# Patient Record
Sex: FEMALE | Race: WHITE | NOT HISPANIC OR LATINO | URBAN - METROPOLITAN AREA
[De-identification: names, ages, dates, MRNs, and addresses within clinical notes are randomized per-mention and may not be internally consistent; named-entity substitution may affect disease eponyms.]

---

## 2017-01-01 ENCOUNTER — INPATIENT (INPATIENT)
Facility: HOSPITAL | Age: 82
LOS: 13 days | End: 2017-02-09
Attending: INTERNAL MEDICINE | Admitting: INTERNAL MEDICINE

## 2017-01-01 ENCOUNTER — INPATIENT (INPATIENT)
Facility: HOSPITAL | Age: 82
LOS: 1 days | Discharge: HOSPICE | End: 2017-01-26
Attending: HOSPITALIST | Admitting: HOSPITALIST
Payer: MEDICARE

## 2017-01-01 VITALS
SYSTOLIC BLOOD PRESSURE: 135 MMHG | DIASTOLIC BLOOD PRESSURE: 61 MMHG | HEART RATE: 47 BPM | RESPIRATION RATE: 18 BRPM | OXYGEN SATURATION: 99 % | TEMPERATURE: 97 F

## 2017-01-01 VITALS
SYSTOLIC BLOOD PRESSURE: 134 MMHG | WEIGHT: 117.29 LBS | TEMPERATURE: 96 F | HEIGHT: 62 IN | OXYGEN SATURATION: 93 % | DIASTOLIC BLOOD PRESSURE: 82 MMHG | RESPIRATION RATE: 18 BRPM | HEART RATE: 63 BPM

## 2017-01-01 VITALS
TEMPERATURE: 99 F | DIASTOLIC BLOOD PRESSURE: 89 MMHG | OXYGEN SATURATION: 98 % | RESPIRATION RATE: 20 BRPM | HEART RATE: 136 BPM | SYSTOLIC BLOOD PRESSURE: 144 MMHG

## 2017-01-01 VITALS — TEMPERATURE: 100 F

## 2017-01-01 DIAGNOSIS — R55 SYNCOPE AND COLLAPSE: ICD-10-CM

## 2017-01-01 DIAGNOSIS — R00.1 BRADYCARDIA, UNSPECIFIED: ICD-10-CM

## 2017-01-01 DIAGNOSIS — N39.0 URINARY TRACT INFECTION, SITE NOT SPECIFIED: ICD-10-CM

## 2017-01-01 DIAGNOSIS — G30.9 ALZHEIMER'S DISEASE, UNSPECIFIED: ICD-10-CM

## 2017-01-01 DIAGNOSIS — I44.2 ATRIOVENTRICULAR BLOCK, COMPLETE: ICD-10-CM

## 2017-01-01 DIAGNOSIS — E87.6 HYPOKALEMIA: ICD-10-CM

## 2017-01-01 DIAGNOSIS — F02.80 DEMENTIA IN OTHER DISEASES CLASSIFIED ELSEWHERE, UNSPECIFIED SEVERITY, WITHOUT BEHAVIORAL DISTURBANCE, PSYCHOTIC DISTURBANCE, MOOD DISTURBANCE, AND ANXIETY: ICD-10-CM

## 2017-01-01 DIAGNOSIS — I10 ESSENTIAL (PRIMARY) HYPERTENSION: ICD-10-CM

## 2017-01-01 DIAGNOSIS — R45.1 RESTLESSNESS AND AGITATION: ICD-10-CM

## 2017-01-01 DIAGNOSIS — G30.8 OTHER ALZHEIMER'S DISEASE: ICD-10-CM

## 2017-01-01 DIAGNOSIS — I49.8 OTHER SPECIFIED CARDIAC ARRHYTHMIAS: ICD-10-CM

## 2017-01-01 DIAGNOSIS — I49.9 CARDIAC ARRHYTHMIA, UNSPECIFIED: ICD-10-CM

## 2017-01-01 DIAGNOSIS — R50.9 FEVER, UNSPECIFIED: ICD-10-CM

## 2017-01-01 DIAGNOSIS — I47.2 VENTRICULAR TACHYCARDIA: ICD-10-CM

## 2017-01-01 LAB
-  AMPICILLIN: SIGNIFICANT CHANGE UP
-  CIPROFLOXACIN: SIGNIFICANT CHANGE UP
-  NITROFURANTOIN: SIGNIFICANT CHANGE UP
-  TETRACYCLINE: SIGNIFICANT CHANGE UP
-  VANCOMYCIN: SIGNIFICANT CHANGE UP
ALBUMIN SERPL ELPH-MCNC: 3.4 G/DL — SIGNIFICANT CHANGE UP (ref 3.3–5)
ALP SERPL-CCNC: 79 U/L — SIGNIFICANT CHANGE UP (ref 40–120)
ALT FLD-CCNC: 20 U/L — SIGNIFICANT CHANGE UP (ref 12–78)
ANION GAP SERPL CALC-SCNC: 10 MMOL/L — SIGNIFICANT CHANGE UP (ref 5–17)
ANION GAP SERPL CALC-SCNC: 11 MMOL/L — SIGNIFICANT CHANGE UP (ref 5–17)
ANION GAP SERPL CALC-SCNC: 12 MMOL/L — SIGNIFICANT CHANGE UP (ref 5–17)
ANION GAP SERPL CALC-SCNC: 13 MMOL/L — SIGNIFICANT CHANGE UP (ref 5–17)
APPEARANCE UR: ABNORMAL
APTT BLD: 30.3 SEC — SIGNIFICANT CHANGE UP (ref 27.5–37.4)
AST SERPL-CCNC: 30 U/L — SIGNIFICANT CHANGE UP (ref 15–37)
BACTERIA # UR AUTO: ABNORMAL
BASOPHILS # BLD AUTO: 0.1 K/UL — SIGNIFICANT CHANGE UP (ref 0–0.2)
BASOPHILS NFR BLD AUTO: 0.6 % — SIGNIFICANT CHANGE UP (ref 0–2)
BILIRUB SERPL-MCNC: 1 MG/DL — SIGNIFICANT CHANGE UP (ref 0.2–1.2)
BILIRUB UR-MCNC: ABNORMAL
BUN SERPL-MCNC: 16 MG/DL — SIGNIFICANT CHANGE UP (ref 7–23)
BUN SERPL-MCNC: 21 MG/DL — SIGNIFICANT CHANGE UP (ref 7–23)
BUN SERPL-MCNC: 21 MG/DL — SIGNIFICANT CHANGE UP (ref 7–23)
BUN SERPL-MCNC: 24 MG/DL — HIGH (ref 7–23)
CALCIUM SERPL-MCNC: 8.3 MG/DL — LOW (ref 8.5–10.1)
CALCIUM SERPL-MCNC: 8.3 MG/DL — LOW (ref 8.5–10.1)
CALCIUM SERPL-MCNC: 8.5 MG/DL — SIGNIFICANT CHANGE UP (ref 8.5–10.1)
CALCIUM SERPL-MCNC: 8.8 MG/DL — SIGNIFICANT CHANGE UP (ref 8.5–10.1)
CHLORIDE SERPL-SCNC: 107 MMOL/L — SIGNIFICANT CHANGE UP (ref 96–108)
CHLORIDE SERPL-SCNC: 109 MMOL/L — HIGH (ref 96–108)
CHLORIDE SERPL-SCNC: 109 MMOL/L — HIGH (ref 96–108)
CHLORIDE SERPL-SCNC: 112 MMOL/L — HIGH (ref 96–108)
CK MB BLD-MCNC: 1.6 % — SIGNIFICANT CHANGE UP (ref 0–3.5)
CK MB BLD-MCNC: 1.8 % — SIGNIFICANT CHANGE UP (ref 0–3.5)
CK MB CFR SERPL CALC: 12.9 NG/ML — HIGH (ref 0.5–3.6)
CK MB CFR SERPL CALC: 3.5 NG/ML — SIGNIFICANT CHANGE UP (ref 0.5–3.6)
CK SERPL-CCNC: 213 U/L — HIGH (ref 26–192)
CK SERPL-CCNC: 689 U/L — HIGH (ref 26–192)
CK SERPL-CCNC: 733 U/L — HIGH (ref 26–192)
CO2 SERPL-SCNC: 23 MMOL/L — SIGNIFICANT CHANGE UP (ref 22–31)
CO2 SERPL-SCNC: 23 MMOL/L — SIGNIFICANT CHANGE UP (ref 22–31)
CO2 SERPL-SCNC: 24 MMOL/L — SIGNIFICANT CHANGE UP (ref 22–31)
CO2 SERPL-SCNC: 24 MMOL/L — SIGNIFICANT CHANGE UP (ref 22–31)
COLOR SPEC: YELLOW — SIGNIFICANT CHANGE UP
COMMENT - URINE: SIGNIFICANT CHANGE UP
CREAT SERPL-MCNC: 0.95 MG/DL — SIGNIFICANT CHANGE UP (ref 0.5–1.3)
CREAT SERPL-MCNC: 1.01 MG/DL — SIGNIFICANT CHANGE UP (ref 0.5–1.3)
CREAT SERPL-MCNC: 1.06 MG/DL — SIGNIFICANT CHANGE UP (ref 0.5–1.3)
CREAT SERPL-MCNC: 1.28 MG/DL — SIGNIFICANT CHANGE UP (ref 0.5–1.3)
CULTURE RESULTS: SIGNIFICANT CHANGE UP
DIFF PNL FLD: ABNORMAL
EOSINOPHIL # BLD AUTO: 0 K/UL — SIGNIFICANT CHANGE UP (ref 0–0.5)
EOSINOPHIL NFR BLD AUTO: 0 % — SIGNIFICANT CHANGE UP (ref 0–6)
EPI CELLS # UR: ABNORMAL
GLUCOSE SERPL-MCNC: 103 MG/DL — HIGH (ref 70–99)
GLUCOSE SERPL-MCNC: 109 MG/DL — HIGH (ref 70–99)
GLUCOSE SERPL-MCNC: 113 MG/DL — HIGH (ref 70–99)
GLUCOSE SERPL-MCNC: 137 MG/DL — HIGH (ref 70–99)
GLUCOSE UR QL: NEGATIVE MG/DL — SIGNIFICANT CHANGE UP
GRAN CASTS # UR COMP ASSIST: ABNORMAL /LPF
HCT VFR BLD CALC: 33.8 % — LOW (ref 34.5–45)
HCT VFR BLD CALC: 34.3 % — LOW (ref 34.5–45)
HCT VFR BLD CALC: 35.7 % — SIGNIFICANT CHANGE UP (ref 34.5–45)
HCT VFR BLD CALC: 37 % — SIGNIFICANT CHANGE UP (ref 34.5–45)
HGB BLD-MCNC: 12.1 G/DL — SIGNIFICANT CHANGE UP (ref 11.5–15.5)
HGB BLD-MCNC: 12.5 G/DL — SIGNIFICANT CHANGE UP (ref 11.5–15.5)
HGB BLD-MCNC: 12.5 G/DL — SIGNIFICANT CHANGE UP (ref 11.5–15.5)
HGB BLD-MCNC: 12.8 G/DL — SIGNIFICANT CHANGE UP (ref 11.5–15.5)
HYALINE CASTS # UR AUTO: ABNORMAL /LPF
INR BLD: 1.11 RATIO — SIGNIFICANT CHANGE UP (ref 0.88–1.16)
KETONES UR-MCNC: ABNORMAL
LEUKOCYTE ESTERASE UR-ACNC: ABNORMAL
LYMPHOCYTES # BLD AUTO: 0.8 K/UL — LOW (ref 1–3.3)
LYMPHOCYTES # BLD AUTO: 4.3 % — LOW (ref 13–44)
MAGNESIUM SERPL-MCNC: 2.1 MG/DL — SIGNIFICANT CHANGE UP (ref 1.8–2.4)
MAGNESIUM SERPL-MCNC: 2.1 MG/DL — SIGNIFICANT CHANGE UP (ref 1.8–2.4)
MCHC RBC-ENTMCNC: 30.4 PG — SIGNIFICANT CHANGE UP (ref 27–34)
MCHC RBC-ENTMCNC: 30.6 PG — SIGNIFICANT CHANGE UP (ref 27–34)
MCHC RBC-ENTMCNC: 30.7 PG — SIGNIFICANT CHANGE UP (ref 27–34)
MCHC RBC-ENTMCNC: 30.8 PG — SIGNIFICANT CHANGE UP (ref 27–34)
MCHC RBC-ENTMCNC: 34.5 GM/DL — SIGNIFICANT CHANGE UP (ref 32–36)
MCHC RBC-ENTMCNC: 35.1 GM/DL — SIGNIFICANT CHANGE UP (ref 32–36)
MCHC RBC-ENTMCNC: 35.8 GM/DL — SIGNIFICANT CHANGE UP (ref 32–36)
MCHC RBC-ENTMCNC: 36.3 GM/DL — HIGH (ref 32–36)
MCV RBC AUTO: 84.8 FL — SIGNIFICANT CHANGE UP (ref 80–100)
MCV RBC AUTO: 85.6 FL — SIGNIFICANT CHANGE UP (ref 80–100)
MCV RBC AUTO: 87.4 FL — SIGNIFICANT CHANGE UP (ref 80–100)
MCV RBC AUTO: 88.2 FL — SIGNIFICANT CHANGE UP (ref 80–100)
METHOD TYPE: SIGNIFICANT CHANGE UP
MONOCYTES # BLD AUTO: 0.9 K/UL — SIGNIFICANT CHANGE UP (ref 0–0.9)
MONOCYTES NFR BLD AUTO: 5.1 % — SIGNIFICANT CHANGE UP (ref 2–14)
NEUTROPHILS # BLD AUTO: 16.5 K/UL — HIGH (ref 1.8–7.4)
NEUTROPHILS NFR BLD AUTO: 90 % — HIGH (ref 43–77)
NITRITE UR-MCNC: NEGATIVE — SIGNIFICANT CHANGE UP
NT-PROBNP SERPL-SCNC: HIGH PG/ML (ref 0–450)
ORGANISM # SPEC MICROSCOPIC CNT: SIGNIFICANT CHANGE UP
ORGANISM # SPEC MICROSCOPIC CNT: SIGNIFICANT CHANGE UP
PH UR: 5 — SIGNIFICANT CHANGE UP (ref 4.8–8)
PHOSPHATE SERPL-MCNC: 2.9 MG/DL — SIGNIFICANT CHANGE UP (ref 2.5–4.5)
PLATELET # BLD AUTO: 206 K/UL — SIGNIFICANT CHANGE UP (ref 150–400)
PLATELET # BLD AUTO: 209 K/UL — SIGNIFICANT CHANGE UP (ref 150–400)
PLATELET # BLD AUTO: 224 K/UL — SIGNIFICANT CHANGE UP (ref 150–400)
PLATELET # BLD AUTO: 254 K/UL — SIGNIFICANT CHANGE UP (ref 150–400)
POTASSIUM SERPL-MCNC: 3.2 MMOL/L — LOW (ref 3.5–5.3)
POTASSIUM SERPL-MCNC: 3.5 MMOL/L — SIGNIFICANT CHANGE UP (ref 3.5–5.3)
POTASSIUM SERPL-MCNC: 3.9 MMOL/L — SIGNIFICANT CHANGE UP (ref 3.5–5.3)
POTASSIUM SERPL-MCNC: 4 MMOL/L — SIGNIFICANT CHANGE UP (ref 3.5–5.3)
POTASSIUM SERPL-SCNC: 3.2 MMOL/L — LOW (ref 3.5–5.3)
POTASSIUM SERPL-SCNC: 3.5 MMOL/L — SIGNIFICANT CHANGE UP (ref 3.5–5.3)
POTASSIUM SERPL-SCNC: 3.9 MMOL/L — SIGNIFICANT CHANGE UP (ref 3.5–5.3)
POTASSIUM SERPL-SCNC: 4 MMOL/L — SIGNIFICANT CHANGE UP (ref 3.5–5.3)
PROT SERPL-MCNC: 6.8 GM/DL — SIGNIFICANT CHANGE UP (ref 6–8.3)
PROT UR-MCNC: 100 MG/DL
PROTHROM AB SERPL-ACNC: 12.4 SEC — SIGNIFICANT CHANGE UP (ref 10–13.1)
RBC # BLD: 3.94 M/UL — SIGNIFICANT CHANGE UP (ref 3.8–5.2)
RBC # BLD: 4.04 M/UL — SIGNIFICANT CHANGE UP (ref 3.8–5.2)
RBC # BLD: 4.09 M/UL — SIGNIFICANT CHANGE UP (ref 3.8–5.2)
RBC # BLD: 4.2 M/UL — SIGNIFICANT CHANGE UP (ref 3.8–5.2)
RBC # FLD: 12.8 % — SIGNIFICANT CHANGE UP (ref 11–15)
RBC # FLD: 12.9 % — SIGNIFICANT CHANGE UP (ref 11–15)
RBC # FLD: 13.3 % — SIGNIFICANT CHANGE UP (ref 11–15)
RBC # FLD: 13.6 % — SIGNIFICANT CHANGE UP (ref 11–15)
RBC CASTS # UR COMP ASSIST: ABNORMAL /HPF (ref 0–4)
SODIUM SERPL-SCNC: 143 MMOL/L — SIGNIFICANT CHANGE UP (ref 135–145)
SODIUM SERPL-SCNC: 144 MMOL/L — SIGNIFICANT CHANGE UP (ref 135–145)
SODIUM SERPL-SCNC: 145 MMOL/L — SIGNIFICANT CHANGE UP (ref 135–145)
SODIUM SERPL-SCNC: 145 MMOL/L — SIGNIFICANT CHANGE UP (ref 135–145)
SP GR SPEC: 1.02 — SIGNIFICANT CHANGE UP (ref 1.01–1.02)
SPECIMEN SOURCE: SIGNIFICANT CHANGE UP
T3 SERPL-MCNC: 84 NG/DL — SIGNIFICANT CHANGE UP (ref 80–200)
T4 AB SER-ACNC: 7.3 UG/DL — SIGNIFICANT CHANGE UP (ref 4.6–12)
TROPONIN I SERPL-MCNC: 0.02 NG/ML — SIGNIFICANT CHANGE UP (ref 0.01–0.04)
TROPONIN I SERPL-MCNC: 0.06 NG/ML — HIGH (ref 0.01–0.04)
TROPONIN I SERPL-MCNC: 0.07 NG/ML — HIGH (ref 0.01–0.04)
TSH SERPL-MCNC: 0.89 UU/ML — SIGNIFICANT CHANGE UP (ref 0.36–3.74)
UROBILINOGEN FLD QL: 1 MG/DL
WBC # BLD: 13.4 K/UL — HIGH (ref 3.8–10.5)
WBC # BLD: 14.6 K/UL — HIGH (ref 3.8–10.5)
WBC # BLD: 14.6 K/UL — HIGH (ref 3.8–10.5)
WBC # BLD: 18.4 K/UL — HIGH (ref 3.8–10.5)
WBC # FLD AUTO: 13.4 K/UL — HIGH (ref 3.8–10.5)
WBC # FLD AUTO: 14.6 K/UL — HIGH (ref 3.8–10.5)
WBC # FLD AUTO: 14.6 K/UL — HIGH (ref 3.8–10.5)
WBC # FLD AUTO: 18.4 K/UL — HIGH (ref 3.8–10.5)
WBC UR QL: ABNORMAL

## 2017-01-01 PROCEDURE — 99285 EMERGENCY DEPT VISIT HI MDM: CPT

## 2017-01-01 PROCEDURE — 99497 ADVNCD CARE PLAN 30 MIN: CPT

## 2017-01-01 PROCEDURE — 99239 HOSP IP/OBS DSCHRG MGMT >30: CPT

## 2017-01-01 PROCEDURE — 71010: CPT | Mod: 26

## 2017-01-01 PROCEDURE — 70450 CT HEAD/BRAIN W/O DYE: CPT | Mod: 26

## 2017-01-01 RX ORDER — ROBINUL 0.2 MG/ML
0.4 INJECTION INTRAMUSCULAR; INTRAVENOUS EVERY 6 HOURS
Qty: 0 | Refills: 0 | Status: DISCONTINUED | OUTPATIENT
Start: 2017-01-01 | End: 2017-01-01

## 2017-01-01 RX ORDER — CEFTRIAXONE 500 MG/1
1 INJECTION, POWDER, FOR SOLUTION INTRAMUSCULAR; INTRAVENOUS ONCE
Qty: 0 | Refills: 0 | Status: COMPLETED | OUTPATIENT
Start: 2017-01-01 | End: 2017-01-01

## 2017-01-01 RX ORDER — POTASSIUM CHLORIDE 20 MEQ
20 PACKET (EA) ORAL
Qty: 0 | Refills: 0 | Status: COMPLETED | OUTPATIENT
Start: 2017-01-01 | End: 2017-01-01

## 2017-01-01 RX ORDER — POTASSIUM CHLORIDE 20 MEQ
10 PACKET (EA) ORAL
Qty: 0 | Refills: 0 | Status: DISCONTINUED | OUTPATIENT
Start: 2017-01-01 | End: 2017-01-01

## 2017-01-01 RX ORDER — MORPHINE SULFATE 50 MG/1
2 CAPSULE, EXTENDED RELEASE ORAL EVERY 4 HOURS
Qty: 0 | Refills: 0 | Status: DISCONTINUED | OUTPATIENT
Start: 2017-01-01 | End: 2017-01-01

## 2017-01-01 RX ORDER — ATROPINE SULFATE 0.1 MG/ML
0.5 SYRINGE (ML) INJECTION ONCE
Qty: 0 | Refills: 0 | Status: DISCONTINUED | OUTPATIENT
Start: 2017-01-01 | End: 2017-01-01

## 2017-01-01 RX ORDER — ACETAMINOPHEN 500 MG
650 TABLET ORAL EVERY 6 HOURS
Qty: 0 | Refills: 0 | Status: DISCONTINUED | OUTPATIENT
Start: 2017-01-01 | End: 2017-01-01

## 2017-01-01 RX ORDER — HEPARIN SODIUM 5000 [USP'U]/ML
5000 INJECTION INTRAVENOUS; SUBCUTANEOUS EVERY 12 HOURS
Qty: 0 | Refills: 0 | Status: DISCONTINUED | OUTPATIENT
Start: 2017-01-01 | End: 2017-01-01

## 2017-01-01 RX ORDER — CEFTRIAXONE 500 MG/1
1 INJECTION, POWDER, FOR SOLUTION INTRAMUSCULAR; INTRAVENOUS EVERY 24 HOURS
Qty: 0 | Refills: 0 | Status: DISCONTINUED | OUTPATIENT
Start: 2017-01-01 | End: 2017-01-01

## 2017-01-01 RX ORDER — MORPHINE SULFATE 50 MG/1
2 CAPSULE, EXTENDED RELEASE ORAL
Qty: 0 | Refills: 0 | Status: DISCONTINUED | OUTPATIENT
Start: 2017-01-01 | End: 2017-01-01

## 2017-01-01 RX ORDER — SODIUM CHLORIDE 9 MG/ML
1000 INJECTION INTRAMUSCULAR; INTRAVENOUS; SUBCUTANEOUS ONCE
Qty: 0 | Refills: 0 | Status: COMPLETED | OUTPATIENT
Start: 2017-01-01 | End: 2017-01-01

## 2017-01-01 RX ORDER — SODIUM CHLORIDE 9 MG/ML
1000 INJECTION, SOLUTION INTRAVENOUS
Qty: 0 | Refills: 0 | Status: DISCONTINUED | OUTPATIENT
Start: 2017-01-01 | End: 2017-01-01

## 2017-01-01 RX ORDER — DOCUSATE SODIUM 100 MG
100 CAPSULE ORAL THREE TIMES A DAY
Qty: 0 | Refills: 0 | Status: DISCONTINUED | OUTPATIENT
Start: 2017-01-01 | End: 2017-01-01

## 2017-01-01 RX ADMIN — SODIUM CHLORIDE 50 MILLILITER(S): 9 INJECTION, SOLUTION INTRAVENOUS at 00:48

## 2017-01-01 RX ADMIN — Medication 0.5 MILLIGRAM(S): at 00:48

## 2017-01-01 RX ADMIN — MORPHINE SULFATE 2 MILLIGRAM(S): 50 CAPSULE, EXTENDED RELEASE ORAL at 15:04

## 2017-01-01 RX ADMIN — Medication 0.5 MILLIGRAM(S): at 17:43

## 2017-01-01 RX ADMIN — MORPHINE SULFATE 2 MILLIGRAM(S): 50 CAPSULE, EXTENDED RELEASE ORAL at 15:39

## 2017-01-01 RX ADMIN — Medication 0.5 MILLIGRAM(S): at 00:37

## 2017-01-01 RX ADMIN — MORPHINE SULFATE 2 MILLIGRAM(S): 50 CAPSULE, EXTENDED RELEASE ORAL at 04:10

## 2017-01-01 RX ADMIN — MORPHINE SULFATE 2 MILLIGRAM(S): 50 CAPSULE, EXTENDED RELEASE ORAL at 10:05

## 2017-01-01 RX ADMIN — Medication 0.5 MILLIGRAM(S): at 17:24

## 2017-01-01 RX ADMIN — MORPHINE SULFATE 2 MILLIGRAM(S): 50 CAPSULE, EXTENDED RELEASE ORAL at 02:41

## 2017-01-01 RX ADMIN — Medication 100 MILLIGRAM(S): at 05:15

## 2017-01-01 RX ADMIN — Medication 0.5 MILLIGRAM(S): at 05:17

## 2017-01-01 RX ADMIN — HEPARIN SODIUM 5000 UNIT(S): 5000 INJECTION INTRAVENOUS; SUBCUTANEOUS at 18:09

## 2017-01-01 RX ADMIN — Medication 0.5 MILLIGRAM(S): at 05:20

## 2017-01-01 RX ADMIN — Medication 0.5 MILLIGRAM(S): at 11:21

## 2017-01-01 RX ADMIN — HEPARIN SODIUM 5000 UNIT(S): 5000 INJECTION INTRAVENOUS; SUBCUTANEOUS at 17:16

## 2017-01-01 RX ADMIN — Medication 0.5 MILLIGRAM(S): at 17:27

## 2017-01-01 RX ADMIN — HEPARIN SODIUM 5000 UNIT(S): 5000 INJECTION INTRAVENOUS; SUBCUTANEOUS at 05:04

## 2017-01-01 RX ADMIN — MORPHINE SULFATE 2 MILLIGRAM(S): 50 CAPSULE, EXTENDED RELEASE ORAL at 23:05

## 2017-01-01 RX ADMIN — MORPHINE SULFATE 2 MILLIGRAM(S): 50 CAPSULE, EXTENDED RELEASE ORAL at 10:12

## 2017-01-01 RX ADMIN — MORPHINE SULFATE 2 MILLIGRAM(S): 50 CAPSULE, EXTENDED RELEASE ORAL at 17:53

## 2017-01-01 RX ADMIN — MORPHINE SULFATE 2 MILLIGRAM(S): 50 CAPSULE, EXTENDED RELEASE ORAL at 14:00

## 2017-01-01 RX ADMIN — Medication 0.5 MILLIGRAM(S): at 00:12

## 2017-01-01 RX ADMIN — MORPHINE SULFATE 2 MILLIGRAM(S): 50 CAPSULE, EXTENDED RELEASE ORAL at 02:51

## 2017-01-01 RX ADMIN — Medication 0.5 MILLIGRAM(S): at 05:50

## 2017-01-01 RX ADMIN — Medication 0.5 MILLIGRAM(S): at 12:07

## 2017-01-01 RX ADMIN — Medication 0.5 MILLIGRAM(S): at 05:21

## 2017-01-01 RX ADMIN — Medication 0.5 MILLIGRAM(S): at 11:04

## 2017-01-01 RX ADMIN — Medication 100 MILLIEQUIVALENT(S): at 22:18

## 2017-01-01 RX ADMIN — MORPHINE SULFATE 2 MILLIGRAM(S): 50 CAPSULE, EXTENDED RELEASE ORAL at 08:45

## 2017-01-01 RX ADMIN — MORPHINE SULFATE 2 MILLIGRAM(S): 50 CAPSULE, EXTENDED RELEASE ORAL at 02:40

## 2017-01-01 RX ADMIN — HEPARIN SODIUM 5000 UNIT(S): 5000 INJECTION INTRAVENOUS; SUBCUTANEOUS at 18:29

## 2017-01-01 RX ADMIN — HEPARIN SODIUM 5000 UNIT(S): 5000 INJECTION INTRAVENOUS; SUBCUTANEOUS at 17:25

## 2017-01-01 RX ADMIN — Medication 0.5 MILLIGRAM(S): at 18:29

## 2017-01-01 RX ADMIN — Medication 0.5 MILLIGRAM(S): at 17:41

## 2017-01-01 RX ADMIN — MORPHINE SULFATE 2 MILLIGRAM(S): 50 CAPSULE, EXTENDED RELEASE ORAL at 20:30

## 2017-01-01 RX ADMIN — Medication 0.5 MILLIGRAM(S): at 12:16

## 2017-01-01 RX ADMIN — CEFTRIAXONE 100 GRAM(S): 500 INJECTION, POWDER, FOR SOLUTION INTRAMUSCULAR; INTRAVENOUS at 00:43

## 2017-01-01 RX ADMIN — SODIUM CHLORIDE 50 MILLILITER(S): 9 INJECTION, SOLUTION INTRAVENOUS at 05:17

## 2017-01-01 RX ADMIN — MORPHINE SULFATE 2 MILLIGRAM(S): 50 CAPSULE, EXTENDED RELEASE ORAL at 15:22

## 2017-01-01 RX ADMIN — Medication 0.5 MILLIGRAM(S): at 12:33

## 2017-01-01 RX ADMIN — MORPHINE SULFATE 2 MILLIGRAM(S): 50 CAPSULE, EXTENDED RELEASE ORAL at 05:32

## 2017-01-01 RX ADMIN — Medication 0.5 MILLIGRAM(S): at 05:09

## 2017-01-01 RX ADMIN — MORPHINE SULFATE 2 MILLIGRAM(S): 50 CAPSULE, EXTENDED RELEASE ORAL at 09:43

## 2017-01-01 RX ADMIN — MORPHINE SULFATE 2 MILLIGRAM(S): 50 CAPSULE, EXTENDED RELEASE ORAL at 00:25

## 2017-01-01 RX ADMIN — HEPARIN SODIUM 5000 UNIT(S): 5000 INJECTION INTRAVENOUS; SUBCUTANEOUS at 17:14

## 2017-01-01 RX ADMIN — CEFTRIAXONE 100 GRAM(S): 500 INJECTION, POWDER, FOR SOLUTION INTRAMUSCULAR; INTRAVENOUS at 05:25

## 2017-01-01 RX ADMIN — CEFTRIAXONE 100 GRAM(S): 500 INJECTION, POWDER, FOR SOLUTION INTRAMUSCULAR; INTRAVENOUS at 05:04

## 2017-01-01 RX ADMIN — MORPHINE SULFATE 2 MILLIGRAM(S): 50 CAPSULE, EXTENDED RELEASE ORAL at 08:59

## 2017-01-01 RX ADMIN — Medication 0.5 MILLIGRAM(S): at 00:33

## 2017-01-01 RX ADMIN — Medication 650 MILLIGRAM(S): at 07:52

## 2017-01-01 RX ADMIN — Medication 0.5 MILLIGRAM(S): at 17:18

## 2017-01-01 RX ADMIN — Medication 0.5 MILLIGRAM(S): at 17:33

## 2017-01-01 RX ADMIN — CEFTRIAXONE 100 GRAM(S): 500 INJECTION, POWDER, FOR SOLUTION INTRAMUSCULAR; INTRAVENOUS at 05:21

## 2017-01-01 RX ADMIN — SODIUM CHLORIDE 50 MILLILITER(S): 9 INJECTION, SOLUTION INTRAVENOUS at 03:00

## 2017-01-01 RX ADMIN — MORPHINE SULFATE 2 MILLIGRAM(S): 50 CAPSULE, EXTENDED RELEASE ORAL at 09:27

## 2017-01-01 RX ADMIN — Medication 0.5 MILLIGRAM(S): at 23:22

## 2017-01-01 RX ADMIN — Medication 0.5 MILLIGRAM(S): at 11:46

## 2017-01-01 RX ADMIN — MORPHINE SULFATE 2 MILLIGRAM(S): 50 CAPSULE, EXTENDED RELEASE ORAL at 03:02

## 2017-01-01 RX ADMIN — Medication 0.5 MILLIGRAM(S): at 18:16

## 2017-01-01 RX ADMIN — MORPHINE SULFATE 2 MILLIGRAM(S): 50 CAPSULE, EXTENDED RELEASE ORAL at 14:02

## 2017-01-01 RX ADMIN — MORPHINE SULFATE 2 MILLIGRAM(S): 50 CAPSULE, EXTENDED RELEASE ORAL at 20:07

## 2017-01-01 RX ADMIN — Medication 650 MILLIGRAM(S): at 12:47

## 2017-01-01 RX ADMIN — MORPHINE SULFATE 2 MILLIGRAM(S): 50 CAPSULE, EXTENDED RELEASE ORAL at 05:18

## 2017-01-01 RX ADMIN — Medication 650 MILLIGRAM(S): at 08:00

## 2017-01-01 RX ADMIN — Medication 0.5 MILLIGRAM(S): at 05:40

## 2017-01-01 RX ADMIN — SODIUM CHLORIDE 50 MILLILITER(S): 9 INJECTION, SOLUTION INTRAVENOUS at 23:05

## 2017-01-01 RX ADMIN — MORPHINE SULFATE 2 MILLIGRAM(S): 50 CAPSULE, EXTENDED RELEASE ORAL at 18:04

## 2017-01-01 RX ADMIN — MORPHINE SULFATE 2 MILLIGRAM(S): 50 CAPSULE, EXTENDED RELEASE ORAL at 09:16

## 2017-01-01 RX ADMIN — MORPHINE SULFATE 2 MILLIGRAM(S): 50 CAPSULE, EXTENDED RELEASE ORAL at 23:50

## 2017-01-01 RX ADMIN — MORPHINE SULFATE 2 MILLIGRAM(S): 50 CAPSULE, EXTENDED RELEASE ORAL at 11:00

## 2017-01-01 RX ADMIN — Medication 0.5 MILLIGRAM(S): at 05:18

## 2017-01-01 RX ADMIN — Medication 20 MILLIEQUIVALENT(S): at 18:09

## 2017-01-01 RX ADMIN — Medication 0.5 MILLIGRAM(S): at 17:04

## 2017-01-01 RX ADMIN — Medication 650 MILLIGRAM(S): at 20:16

## 2017-01-01 RX ADMIN — Medication 0.5 MILLIGRAM(S): at 12:25

## 2017-01-01 RX ADMIN — Medication 10 MILLIGRAM(S): at 07:53

## 2017-01-01 RX ADMIN — MORPHINE SULFATE 2 MILLIGRAM(S): 50 CAPSULE, EXTENDED RELEASE ORAL at 13:11

## 2017-01-01 RX ADMIN — MORPHINE SULFATE 2 MILLIGRAM(S): 50 CAPSULE, EXTENDED RELEASE ORAL at 05:02

## 2017-01-01 RX ADMIN — MORPHINE SULFATE 2 MILLIGRAM(S): 50 CAPSULE, EXTENDED RELEASE ORAL at 21:29

## 2017-01-01 RX ADMIN — SODIUM CHLORIDE 50 MILLILITER(S): 9 INJECTION, SOLUTION INTRAVENOUS at 17:14

## 2017-01-01 RX ADMIN — ROBINUL 0.4 MILLIGRAM(S): 0.2 INJECTION INTRAMUSCULAR; INTRAVENOUS at 10:14

## 2017-01-01 RX ADMIN — MORPHINE SULFATE 2 MILLIGRAM(S): 50 CAPSULE, EXTENDED RELEASE ORAL at 10:41

## 2017-01-01 RX ADMIN — CEFTRIAXONE 100 GRAM(S): 500 INJECTION, POWDER, FOR SOLUTION INTRAMUSCULAR; INTRAVENOUS at 05:05

## 2017-01-01 RX ADMIN — MORPHINE SULFATE 2 MILLIGRAM(S): 50 CAPSULE, EXTENDED RELEASE ORAL at 13:28

## 2017-01-01 RX ADMIN — CEFTRIAXONE 100 GRAM(S): 500 INJECTION, POWDER, FOR SOLUTION INTRAMUSCULAR; INTRAVENOUS at 05:47

## 2017-01-01 RX ADMIN — MORPHINE SULFATE 2 MILLIGRAM(S): 50 CAPSULE, EXTENDED RELEASE ORAL at 03:40

## 2017-01-01 RX ADMIN — Medication 0.5 MILLIGRAM(S): at 12:06

## 2017-01-01 RX ADMIN — Medication 0.5 MILLIGRAM(S): at 23:44

## 2017-01-01 RX ADMIN — Medication 0.5 MILLIGRAM(S): at 11:47

## 2017-01-01 RX ADMIN — MORPHINE SULFATE 2 MILLIGRAM(S): 50 CAPSULE, EXTENDED RELEASE ORAL at 18:03

## 2017-01-01 RX ADMIN — Medication 0.5 MILLIGRAM(S): at 23:48

## 2017-01-01 RX ADMIN — Medication 650 MILLIGRAM(S): at 13:43

## 2017-01-01 RX ADMIN — MORPHINE SULFATE 2 MILLIGRAM(S): 50 CAPSULE, EXTENDED RELEASE ORAL at 10:00

## 2017-01-01 RX ADMIN — MORPHINE SULFATE 2 MILLIGRAM(S): 50 CAPSULE, EXTENDED RELEASE ORAL at 01:08

## 2017-01-01 RX ADMIN — Medication 0.5 MILLIGRAM(S): at 23:56

## 2017-01-01 RX ADMIN — Medication 0.5 MILLIGRAM(S): at 05:05

## 2017-01-01 RX ADMIN — MORPHINE SULFATE 2 MILLIGRAM(S): 50 CAPSULE, EXTENDED RELEASE ORAL at 18:26

## 2017-01-01 RX ADMIN — MORPHINE SULFATE 2 MILLIGRAM(S): 50 CAPSULE, EXTENDED RELEASE ORAL at 20:10

## 2017-01-01 RX ADMIN — MORPHINE SULFATE 2 MILLIGRAM(S): 50 CAPSULE, EXTENDED RELEASE ORAL at 23:27

## 2017-01-01 RX ADMIN — Medication 0.5 MILLIGRAM(S): at 17:25

## 2017-01-01 RX ADMIN — MORPHINE SULFATE 2 MILLIGRAM(S): 50 CAPSULE, EXTENDED RELEASE ORAL at 03:21

## 2017-01-01 RX ADMIN — MORPHINE SULFATE 2 MILLIGRAM(S): 50 CAPSULE, EXTENDED RELEASE ORAL at 20:40

## 2017-01-01 RX ADMIN — Medication 0.5 MILLIGRAM(S): at 00:10

## 2017-01-01 RX ADMIN — Medication 0.5 MILLIGRAM(S): at 12:30

## 2017-01-01 RX ADMIN — HEPARIN SODIUM 5000 UNIT(S): 5000 INJECTION INTRAVENOUS; SUBCUTANEOUS at 05:52

## 2017-01-01 RX ADMIN — MORPHINE SULFATE 2 MILLIGRAM(S): 50 CAPSULE, EXTENDED RELEASE ORAL at 10:03

## 2017-01-01 RX ADMIN — CEFTRIAXONE 100 GRAM(S): 500 INJECTION, POWDER, FOR SOLUTION INTRAMUSCULAR; INTRAVENOUS at 05:18

## 2017-01-01 RX ADMIN — HEPARIN SODIUM 5000 UNIT(S): 5000 INJECTION INTRAVENOUS; SUBCUTANEOUS at 05:21

## 2017-01-01 RX ADMIN — MORPHINE SULFATE 2 MILLIGRAM(S): 50 CAPSULE, EXTENDED RELEASE ORAL at 00:09

## 2017-01-01 RX ADMIN — Medication 0.5 MILLIGRAM(S): at 14:50

## 2017-01-01 RX ADMIN — MORPHINE SULFATE 2 MILLIGRAM(S): 50 CAPSULE, EXTENDED RELEASE ORAL at 23:13

## 2017-01-01 RX ADMIN — CEFTRIAXONE 100 GRAM(S): 500 INJECTION, POWDER, FOR SOLUTION INTRAMUSCULAR; INTRAVENOUS at 23:49

## 2017-01-01 RX ADMIN — MORPHINE SULFATE 2 MILLIGRAM(S): 50 CAPSULE, EXTENDED RELEASE ORAL at 14:09

## 2017-01-01 RX ADMIN — MORPHINE SULFATE 2 MILLIGRAM(S): 50 CAPSULE, EXTENDED RELEASE ORAL at 16:22

## 2017-01-01 RX ADMIN — Medication 0.5 MILLIGRAM(S): at 05:53

## 2017-01-01 RX ADMIN — Medication 0.5 MILLIGRAM(S): at 23:00

## 2017-01-01 RX ADMIN — MORPHINE SULFATE 2 MILLIGRAM(S): 50 CAPSULE, EXTENDED RELEASE ORAL at 08:35

## 2017-01-01 RX ADMIN — MORPHINE SULFATE 2 MILLIGRAM(S): 50 CAPSULE, EXTENDED RELEASE ORAL at 05:31

## 2017-01-01 RX ADMIN — Medication 20 MILLIEQUIVALENT(S): at 16:42

## 2017-01-01 RX ADMIN — MORPHINE SULFATE 2 MILLIGRAM(S): 50 CAPSULE, EXTENDED RELEASE ORAL at 02:15

## 2017-01-01 RX ADMIN — MORPHINE SULFATE 2 MILLIGRAM(S): 50 CAPSULE, EXTENDED RELEASE ORAL at 09:15

## 2017-01-01 RX ADMIN — MORPHINE SULFATE 2 MILLIGRAM(S): 50 CAPSULE, EXTENDED RELEASE ORAL at 21:45

## 2017-01-01 RX ADMIN — Medication 0.5 MILLIGRAM(S): at 01:01

## 2017-01-01 RX ADMIN — MORPHINE SULFATE 2 MILLIGRAM(S): 50 CAPSULE, EXTENDED RELEASE ORAL at 01:56

## 2017-01-01 RX ADMIN — HEPARIN SODIUM 5000 UNIT(S): 5000 INJECTION INTRAVENOUS; SUBCUTANEOUS at 05:35

## 2017-01-01 RX ADMIN — HEPARIN SODIUM 5000 UNIT(S): 5000 INJECTION INTRAVENOUS; SUBCUTANEOUS at 05:47

## 2017-01-01 RX ADMIN — MORPHINE SULFATE 2 MILLIGRAM(S): 50 CAPSULE, EXTENDED RELEASE ORAL at 09:44

## 2017-01-01 RX ADMIN — MORPHINE SULFATE 2 MILLIGRAM(S): 50 CAPSULE, EXTENDED RELEASE ORAL at 11:55

## 2017-01-01 RX ADMIN — CEFTRIAXONE 100 GRAM(S): 500 INJECTION, POWDER, FOR SOLUTION INTRAMUSCULAR; INTRAVENOUS at 05:02

## 2017-01-01 RX ADMIN — Medication 0.5 MILLIGRAM(S): at 05:11

## 2017-01-01 RX ADMIN — Medication 0.5 MILLIGRAM(S): at 12:01

## 2017-01-01 RX ADMIN — MORPHINE SULFATE 2 MILLIGRAM(S): 50 CAPSULE, EXTENDED RELEASE ORAL at 05:43

## 2017-01-01 RX ADMIN — MORPHINE SULFATE 2 MILLIGRAM(S): 50 CAPSULE, EXTENDED RELEASE ORAL at 20:23

## 2017-01-01 RX ADMIN — ROBINUL 0.4 MILLIGRAM(S): 0.2 INJECTION INTRAMUSCULAR; INTRAVENOUS at 12:02

## 2017-01-01 RX ADMIN — SODIUM CHLORIDE 1000 MILLILITER(S): 9 INJECTION INTRAMUSCULAR; INTRAVENOUS; SUBCUTANEOUS at 21:18

## 2017-01-01 RX ADMIN — Medication 0.5 MILLIGRAM(S): at 11:48

## 2017-01-01 RX ADMIN — Medication 0.5 MILLIGRAM(S): at 17:16

## 2017-01-01 RX ADMIN — Medication 0.5 MILLIGRAM(S): at 12:27

## 2017-01-01 RX ADMIN — HEPARIN SODIUM 5000 UNIT(S): 5000 INJECTION INTRAVENOUS; SUBCUTANEOUS at 05:16

## 2017-01-01 RX ADMIN — Medication 0.5 MILLIGRAM(S): at 17:53

## 2017-01-01 RX ADMIN — Medication 0.5 MILLIGRAM(S): at 05:04

## 2017-01-01 RX ADMIN — MORPHINE SULFATE 2 MILLIGRAM(S): 50 CAPSULE, EXTENDED RELEASE ORAL at 17:54

## 2017-01-01 RX ADMIN — CEFTRIAXONE 100 GRAM(S): 500 INJECTION, POWDER, FOR SOLUTION INTRAMUSCULAR; INTRAVENOUS at 05:53

## 2017-01-01 RX ADMIN — Medication 100 MILLIGRAM(S): at 16:42

## 2017-01-01 RX ADMIN — Medication 0.5 MILLIGRAM(S): at 23:11

## 2017-01-01 RX ADMIN — MORPHINE SULFATE 2 MILLIGRAM(S): 50 CAPSULE, EXTENDED RELEASE ORAL at 02:20

## 2017-01-24 NOTE — ED ADULT NURSE REASSESSMENT NOTE - NS ED NURSE REASSESS COMMENT FT1
89 yo female , resting comfortably, sons going home, leaving contact number, Bry Romo [phone], son, Mike Demetrius.

## 2017-01-24 NOTE — ED ADULT TRIAGE NOTE - CHIEF COMPLAINT QUOTE
pt found on bathroom floor by . as per pt's  " when I found her she wasn't talking just starring at the ceiling."  pt denies pain at present. pt has history of dementia. denies taking any blood thinners.

## 2017-01-24 NOTE — ED ADULT NURSE NOTE - OBJECTIVE STATEMENT
89 y.o. female s/p fall at home. pt alert and oriented x3, ambulates without difficulty. Pt. has no complaints at this time. Denies any pain at this time. Family at bedside Denies LOC but did hit head. Safety and comfort maintained,

## 2017-01-25 NOTE — PROGRESS NOTE ADULT - ATTENDING COMMENTS
pt is could not give any history lab shows. uti . moriah meneses andcardiology consult and kathy hill stand by

## 2017-01-25 NOTE — H&P ADULT. - HISTORY OF PRESENT ILLNESS
89 yo f with PMH of alzheimer's BIBEMS when found down on bathroom floor. Patient with dementia and no family at bedside. Unable to obtain HPI or ROS.      CT head: No acute intracranial hemorrhage, mass effect, or evidence of displaced calvarial fracture.  EK Afib. Questionable complete heart block on bedside monitor

## 2017-01-25 NOTE — CHART NOTE - NSCHARTNOTEFT_GEN_A_CORE
Patient is a 89y old  Female who presents with a chief complaint of Fall (25 Jan 2017 03:40)  pmh of alzhemiers dementia   rrt called for bradycardia   rhythm strip reveals sr with pac vs episodes of third degree heart block   vs 152/92 rr 18 pulse 47 temp 97.8     Vital Signs Last 24 Hrs  T(C): 36.7, Max: 37 (01-25 @ 13:02)  T(F): 98, Max: 98.6 (01-25 @ 13:02)  HR: 70 (46 - 99)  BP: 150/70 (120/42 - 160/80)  BP(mean): 67 (67 - 67)  RR: 16 (15 - 20)  SpO2: 96% (95% - 98%)  CARDIAC MARKERS ( 25 Jan 2017 20:49 )  x     / x     / 689 U/L / x     / x      CARDIAC MARKERS ( 24 Jan 2017 21:23 )  .023 ng/mL / x     / 213 U/L / x     / 3.5 ng/mL      PT/INR - ( 24 Jan 2017 22:21 )   PT: 12.4 sec;   INR: 1.11 ratio         PTT - ( 24 Jan 2017 22:21 )  PTT:30.3 sec                        12.5   14.6  )-----------( 254      ( 25 Jan 2017 20:49 )             34.3     25 Jan 2017 20:49    145    |  112    |  21     ----------------------------<  113    3.9     |  23     |  1.06     Ca    8.5        25 Jan 2017 20:49  Phos  2.9       25 Jan 2017 06:38  Mg     2.1       25 Jan 2017 20:49    TPro  6.8    /  Alb  3.4    /  TBili  1.0    /  DBili  x      /  AST  30     /  ALT  20     /  AlkPhos  79     24 Jan 2017 21:23    LIVER FUNCTIONS - ( 24 Jan 2017 21:23 )  Alb: 3.4 g/dL / Pro: 6.8 gm/dL / ALK PHOS: 79 U/L / ALT: 20 U/L / AST: 30 U/L / GGT: x                                     RADIOLOGY & ADDITIONAL TESTS:    Imaging Personally Reviewed:  [ ] YES  [ ] NO    Consultant(s) Notes Reviewed:  [ ] YES  [ ] NO      CAPILLARY BLOOD GLUCOSE    docusate sodium 100milliGRAM(s) Oral three times a day  cefTRIAXone   IVPB 1Gram(s) IV Intermittent every 24 hours  heparin  Injectable 5000Unit(s) SubCutaneous every 12 hours  dextrose 5% + sodium chloride 0.45%. 1000milliLiter(s) IV Continuous <Continuous>  atropine Injectable 0.5milliGRAM(s) IV Push once PRN  potassium chloride  10 mEq/100 mL IVPB 10milliEquivalent(s) IV Intermittent every 1 hour      REVIEW OF SYSTEMS:    RESPIRATORY: No cough, wheezing, chills or hemoptysis; No shortness of breath  CARDIOVASCULAR: No chest pain, palpitations, dizziness, or leg swelling  GASTROINTESTINAL: No abdominal or epigastric pain. No nausea, vomiting, or hematemesis; No diarrhea or constipation. No melena or hematochezia.  GENITOURINARY: No dysuria, frequency, hematuria, or incontinence  NEUROLOGICAL: No headaches, memory loss, loss of strength, numbness, or tremors      PHYSICAL EXAM:    GENERAL: NAD, well-groomed, well-developed  NERVOUS SYSTEM:  Alert & Oriented X3, Good concentration; Motor Strength 5/5 B/L upper and lower extremities; DTRs 2+ intact and symmetric  CHEST/LUNG: Clear to percussion bilaterally; No rales, rhonchi, wheezing, or rubs  HEART: Regular rate and rhythm; No murmurs, rubs, or gallops  ABDOMEN: Soft, Nontender, Nondistended; Bowel sounds present  EXTREMITIES:  2+ Peripheral Pulses, No clubbing, cyanosis, or edema    Assessment: Patient 89y  admit for syncope noted with uti and being treated K= 3.2 supplemented with 20 la x 3     noted with episodes of 3 degree heart block ventricular beats 37   ekg sr 62 lad with pac's no st elevation pr 1.6 qtc 472  pt remains at baseline sitting in chair alert and slightly combative with staff   pacer to bedside  case d/w dr Rosenberg keep electrolytes wnl K= 4.0 mag 2.0   K+ 10 iv x 3   stat basic mag cbc troponin   case dw dr michelle     Alzheimer&#x27;s dementia  HTN (hypertension)  No significant past surgical history      Plan:  - Continue current treatment  - follow up labs  - D/w                       aware and agree with the plan  - will continue to follow up

## 2017-01-25 NOTE — H&P ADULT. - RS GEN PE MLT RESP DETAILS PC
clear to auscultation bilaterally/respirations non-labored/breath sounds equal/no chest wall tenderness/good air movement/airway patent

## 2017-01-25 NOTE — CONSULT NOTE ADULT - ASSESSMENT
89 yo female found on floor with evidence of trauma to back of head. Patient demented no history avlbl.  Mechanical fall vs syncope.  ·	Not candidtae for aggressive diagnostic testing given age, debility and dementia.  ·	Agree with 24 hr tele, TTE, check TFT's,  check for orthostatics, PT eval and early discharge if stable and ambulatory. 89 yo female found on floor with evidence of trauma to back of head. Patient demented no history avlbl.  Mechanical fall vs syncope.  ·	Not candidtae for aggressive diagnostic testing given age, debility and dementia.  ·	Evidence of concurrent UTI - tx as per IM  ·	Mild hypokalemia, replete as needed.  ·	Agree with 24 hr tele, TTE, check TFT's,  check for orthostatics, PT eval and early discharge if stable and ambulatory.

## 2017-01-25 NOTE — CONSULT NOTE ADULT - SUBJECTIVE AND OBJECTIVE BOX
CARDIOLOGY CONSULT NOTE    Patient is a 89y Female with a known history of :  Alzheimers disease of other onset without behavioral disturbance (G30.8)  Urinary tract infection, site unspecified (N39.0)  UTI (urinary tract infection) (N39.0)  HTN (hypertension) (I10)  Syncope (R55)    HPI:  89 yo f with PMH of alzheimer's BIBEMS when found down on bathroom floor.by son.  Patient with dementia and no family at bedside. Unable to obtain HPI or ROS. No EMS report.  Cannot r/o fall, trauma to occiput.  Prior admission for fall, hip fracture. treated for hypertension but family states she is not on any meds at present.      CT head: No acute intracranial hemorrhage, mass effect, or evidence of displaced calvarial fracture.  EK Afib. Questionable complete heart block on bedside monitor (2017 03:40)      REVIEW OF SYSTEMS:    Not available    MEDICATIONS  (STANDING):  docusate sodium 100milliGRAM(s) Oral three times a day  cefTRIAXone   IVPB 1Gram(s) IV Intermittent every 24 hours  heparin  Injectable 5000Unit(s) SubCutaneous every 12 hours  dextrose 5% + sodium chloride 0.45%. 1000milliLiter(s) IV Continuous <Continuous>    MEDICATIONS  (PRN):  atropine Injectable 0.5milliGRAM(s) IntraMuscular once PRN bradycardia <40      ALLERGIES: No Known Allergies      FAMILY HISTORY: Family history unknown      PHYSICAL EXAMINATION:  -----------------------------  T(C): 36.1, Max: 36.7 (01-24 @ 22:45)  HR: 99 (47 - 99)  BP: 160/80 (135/61 - 160/80)  RR: 17 (15 - 18)  SpO2: 97% (95% - 99%)  Wt(kg): --        Constitutional: confused, no acute distress. trauma to occiout with hematoma.  Eyes: the conjunctiva exhibited no abnormalities and the eyelids demonstrated no xanthelasmas.   HEENT: normal oral mucosa, no oral pallor and no oral cyanosis.   Neck: normal jugular venous A waves present, normal jugular venous V waves present and no jugular venous mahoney A waves. +goiter?  Pulmonary: no respiratory distress, normal respiratory rhythm and effort, no accessory muscle use and lungs were clear to auscultation bilaterally.   Cardiovascular: heart rate and rhythm were normal,diminishedl S1 and normal S2 and 2/6 systolic ejection murmur over LUSB/RUSB non radiating, no gallop, rub, heave or thrill are present. Trace edema.  Abdomen: soft, non-tender, no hepato-splenomegaly and no abdominal mass palpated.   Musculoskeletal: the gait could not be assessed..   Extremities: no clubbing of the fingernails, no localized cyanosis, no petechial hemorrhages and no ischemic changes.   Skin: normal skin color and pigmentation, no rash, no venous stasis, no skin lesions, no skin ulcer and no xanthoma was observed.   Psychiatric: oriented to person, place, and time, the affect was normal, the mood was normal and not feeling anxious.     LABS:   --------  2017 21:23    144    |  107    |  24     ----------------------------<  137    4.0     |  24     |  1.28     Ca    8.8        2017 21:23    TPro  6.8    /  Alb  3.4    /  TBili  1.0    /  DBili  x      /  AST  30     /  ALT  20     /  AlkPhos  79     2017 21:23                         12.1   13.4  )-----------( 224      ( 2017 06:38 )             33.8     PT/INR - ( 2017 22:21 )   PT: 12.4 sec;   INR: 1.11 ratio         PTT - ( 2017 22:21 )  PTT:30.3 sec     @ 21:23 CPK total:--, CKMB --, Troponin I - .023 ng/mL          RADIOLOGY:  -----------------    EXAM:  CT BRAIN                            PROCEDURE DATE:  2017        INTERPRETATION:  EXAMINATION DATE: 2017 at 2034 hours.  CLINICAL INFORMATION: Head trauma, syncope.    TECHNIQUE:  Serial axial images were obtained from the skull base to the   vertex without intravenous contrast. Coronal and sagittal reformatted   images were obtained.    COMPARISON EXAMINATION: None.    FINDINGS:      VENTRICLES AND SULCI:  Prominence of the ventricles and sulci, probably   on the basisof diffuse cerebral volume loss.  INTRA-AXIAL:  No acute intracranial hemorrhage, mass effect, or evidence   of acute territorial infarct. Small-vessel white matter ischemic changes   are present.  EXTRA-AXIAL:  No mass or collection is seen.  VISUALIZED SINUSES:  Clear.  VISUALIZED MASTOIDS:  Clear.  CALVARIUM:  Normal.  MISCELLANEOUS:  None.    IMPRESSION:    No acute intracranial hemorrhage, mass effect, or evidence of displaced   calvarial fracture.        EXAM:  CHEST SINGLE VIEW                            PROCEDURE DATE:  2017        INTERPRETATION:  CLINICAL STATEMENT: Chest Pain.    TECHNIQUE: AP view of the chest.    COMPARISON: 2015    FINDINGS/  IMPRESSION:  Study limited due to rotation.    No consolidation or pleural effusion. Mild elevation right hemidiaphragm.    Heart size cannot be accurately assessed in this projection.    Chronic right rib deformities        ECG: Ventricular bigeminy, CRBBB, NSSTW changes, no old ecg for interp

## 2017-01-25 NOTE — H&P ADULT. - PROBLEM SELECTOR PLAN 1
admit to telemetry, fall risk protocol, monitor blood pressure and heart rate/ rhythm, cardiology consult

## 2017-01-25 NOTE — ED PROVIDER NOTE - MEDICAL DECISION MAKING DETAILS
Patient s/p syncope with uti. Labs and imaging reviewed. Patient received ivfs and abx. She is now admitted to medicine for further management.

## 2017-01-25 NOTE — H&P ADULT. - PROBLEM SELECTOR PLAN 2
telemetry monitoring, Possible atropine for bradycardia, external pacemaker at bedside. AM EKG, cardiology consult

## 2017-01-25 NOTE — ED ADULT NURSE REASSESSMENT NOTE - NS ED NURSE REASSESS COMMENT FT1
verbal report given to RN jose Briggs in noacute distress daughter at bedside transported to Receiving u nit accompanied by Ed tech

## 2017-01-25 NOTE — ED PROVIDER NOTE - CONSTITUTIONAL, MLM
normal... Well appearing, well nourished, awake, alert, oriented to person and in no apparent distress.

## 2017-01-25 NOTE — ED PROVIDER NOTE - OBJECTIVE STATEMENT
Pertinent PMH/PSH/FHx/SHx and Review of Systems contained within:  89 yo f with PMH of alzheimer's BIBEMS when found down on bathroom floor. No aggravating or relieving factors, No fever/chills, No photophobia/eye pain/changes in vision, No ear pain/sore throat/dysphagia, No chest pain/palpitations, no SOB/cough/wheeze/stridor, No abdominal pain, No N/V/D, no dysuria/frequency/discharge, No neck/back pain, no rash

## 2017-01-25 NOTE — H&P ADULT. - ASSESSMENT
89 year old female admitted status post fall with arrhythmia possibly Afib or complete heart block, unable to obtain history at this time

## 2017-01-26 NOTE — GOALS OF CARE CONVERSATION - PERSONAL ADVANCE DIRECTIVE - WHAT MATTERS MOST
MOLST Form completed, DNR/ DNI, Comfort measures only, Hospice evaluation called in .Pending Hospice eval.

## 2017-01-26 NOTE — PROGRESS NOTE ADULT - PROBLEM SELECTOR PROBLEM 4
Cardiac arrhythmia, unspecified cardiac arrhythmia type
Urinary tract infection without hematuria, site unspecified

## 2017-01-26 NOTE — DISCHARGE NOTE ADULT - PATIENT PORTAL LINK FT
“You can access the [employer] Patient Portal, offered by [facility], by registering with the following website: [email or web address]”

## 2017-01-26 NOTE — DISCHARGE NOTE ADULT - CARE PLAN
Principal Discharge DX:	Syncope, unspecified syncope type  Goal:	stable  Instructions for follow-up, activity and diet:	inpatient hospice, comfort care  Secondary Diagnosis:	Cardiac arrhythmia, unspecified cardiac arrhythmia type  Secondary Diagnosis:	Bradycardia  Secondary Diagnosis:	Urinary tract infection without hematuria, site unspecified

## 2017-01-26 NOTE — DISCHARGE NOTE ADULT - SECONDARY DIAGNOSIS.
Cardiac arrhythmia, unspecified cardiac arrhythmia type Bradycardia Urinary tract infection without hematuria, site unspecified

## 2017-01-26 NOTE — PROGRESS NOTE ADULT - PROBLEM SELECTOR PLAN 4
ekg shows heart block  sometimes 2 nd degree and some times 3 degree. will cardiology consult
on Rocephin IV

## 2017-01-26 NOTE — PROGRESS NOTE ADULT - PROBLEM SELECTOR PLAN 1
uti and will get antibiotics
-likely cardiac source    -spoke to son Homero Romo([phone]) (pt's health care proxy) who clearly stated that he does not want any invasive interventions or agrresive treatment options not limited to EPstudy/pacemaker . He opted for only comfort care ,stated "I don't want my mom to go through any pain at this age, make her comfortable". He agreed for palliative care, consult requested  NPO for now as pt is not completely awake

## 2017-01-26 NOTE — PROGRESS NOTE ADULT - SUBJECTIVE AND OBJECTIVE BOX
Patient is a 89y old  Female who presents with a chief complaint of Fall (2017 03:40)      INTERVAL HPI/OVERNIGHT EVENTS:  events noted      MEDICATIONS  (STANDING):  docusate sodium 100milliGRAM(s) Oral three times a day  cefTRIAXone   IVPB 1Gram(s) IV Intermittent every 24 hours  heparin  Injectable 5000Unit(s) SubCutaneous every 12 hours    MEDICATIONS  (PRN):  atropine Injectable 0.5milliGRAM(s) IntraMuscular once PRN bradycardia <40      Allergies    No Known Allergies    Intolerances        REVIEW OF SYSTEMS:  CONSTITUTIONAL: No fever, weight loss, or fatigue  EYES: No eye pain, visual disturbances, or discharge  ENMT:  No difficulty hearing, tinnitus, vertigo; No sinus or throat pain  NECK: No pain or stiffness  BREASTS: No pain, masses, or nipple discharge  RESPIRATORY: No cough, wheezing, chills or hemoptysis; No shortness of breath  CARDIOVASCULAR: No chest pain, palpitations, dizziness, or leg swelling  GASTROINTESTINAL: No abdominal or epigastric pain. No nausea, vomiting, or hematemesis; No diarrhea or constipation. No melena or hematochezia.  GENITOURINARY: No dysuria, frequency, hematuria, or incontinence  NEUROLOGICAL: No headaches, memory loss, loss of strength, numbness, or tremors  SKIN: No itching, burning, rashes, or lesions   LYMPH NODES: No enlarged glands  ENDOCRINE: No heat or cold intolerance; No hair loss  MUSCULOSKELETAL: No joint pain or swelling; No muscle, back, or extremity pain  PSYCHIATRIC: No depression, anxiety, mood swings, or difficulty sleeping  HEME/LYMPH: No easy bruising, or bleeding gums  ALLERGY AND IMMUNOLOGIC: No hives or eczema    Vital Signs Last 24 Hrs  T(C): 36.3, Max: 36.7 ( @ 22:45)  T(F): 97.4, Max: 98 ( @ 22:45)  HR: 77 (47 - 77)  BP: 148/58 (135/61 - 148/58)  BP(mean): --  RR: 18 (15 - 18)  SpO2: 95% (95% - 99%)    PHYSICAL EXAM:  GENERAL: NAD, well-groomed, well-developed  HEAD:  Atraumatic, Normocephalic  EYES: EOMI, PERRLA, conjunctiva and sclera clear  ENMT: No tonsillar erythema, exudates, or enlargement; Moist mucous membranes, Good dentition, No lesions  NECK: Supple, No JVD, Normal thyroid  NERVOUS SYSTEM:  Alert & Oriented X3, Good concentration; Motor Strength 5/5 B/L upper and lower extremities; DTRs 2+ intact and symmetric  CHEST/LUNG: Clear to percussion bilaterally; No rales, rhonchi, wheezing, or rubs  HEART: Regular rate and rhythm; No murmurs, rubs, or gallops  ABDOMEN: Soft, Nontender, Nondistended; Bowel sounds present  EXTREMITIES:  2+ Peripheral Pulses, No clubbing, cyanosis, or edema  LYMPH: No lymphadenopathy noted  SKIN: No rashes or lesions    LABS:                        12.8   18.4  )-----------( 206      ( 2017 21:23 )             37.0     2017 21:23    144    |  107    |  24     ----------------------------<  137    4.0     |  24     |  1.28     Ca    8.8        2017 21:23    TPro  6.8    /  Alb  3.4    /  TBili  1.0    /  DBili  x      /  AST  30     /  ALT  20     /  AlkPhos  79     2017 21:23    PT/INR - ( 2017 22:21 )   PT: 12.4 sec;   INR: 1.11 ratio         PTT - ( 2017 22:21 )  PTT:30.3 sec  Urinalysis Basic - ( 2017 22:34 )    Color: Yellow / Appearance: very cloudy / S.025 / pH: x  Gluc: x / Ketone: Moderate  / Bili: Small / Urobili: 1 mg/dL   Blood: x / Protein: 100 mg/dL / Nitrite: Negative   Leuk Esterase: Small / RBC: 6-10 /HPF / WBC 6-10   Sq Epi: x / Non Sq Epi: Moderate / Bacteria: Moderate      CAPILLARY BLOOD GLUCOSE      RADIOLOGY & ADDITIONAL TESTS:    Imaging Personally Reviewed:  [ ] YES  [ ] NO    Consultant(s) Notes Reviewed:  [ ] YES  [ ] NO    Care Discussed with Consultants/Other Providers [ ] YES  [ ] NO
Initial Consultaion Note  Requested by Name:  Date/ Time:  Reason for referral/ Consultation:    HPI:  89 yo f with PMH of alzheimer's BIBEMS when found down on bathroom floor. Patient with dementia  Unable to obtain HPI or ROS.      CT head: No acute intracranial hemorrhage, mass effect, or evidence of displaced calvarial fracture.  EK Afib. Questionable complete heart block on bedside monitor (2017 03:40)      PAST MEDICAL & SURGICAL HISTORY:  Alzheimer&#x27;s dementia  HTN (hypertension)  No significant past surgical history      SOCIAL HISTORY:                                   Admitted from: home [x ] SNF [ ] [facility] [ ] [facility] [ ]    Surrogate/HCP/Guardian: [ ] YES [ ] NO. Name/ Phone#:  Significant other/partner:                     Children:                         Restorationism/Spirituality:    FAMILY HISTORY:  Family history unknown      Baseline ADLs (Prior to admission)  Independent [ ]  Moderately [x ] fully [ ]  Dependent [ ] moderately [ ] fully [ ]    ADVANCE DIRECTIVES:  [ x] YES [ ] NO   DNR [x ] YES [ ] NO  Completed on:                     MOLST  [ x] YES [ ] NO   Completed on:  Living Will  [ ] YES [ ] NO   Completed on:    Allergies    No Known Allergies    Intolerances      MEDICATIONS  (STANDING):  docusate sodium 100milliGRAM(s) Oral three times a day  cefTRIAXone   IVPB 1Gram(s) IV Intermittent every 24 hours  heparin  Injectable 5000Unit(s) SubCutaneous every 12 hours  dextrose 5% + sodium chloride 0.45%. 1000milliLiter(s) IV Continuous <Continuous>    MEDICATIONS  (PRN):  atropine Injectable 0.5milliGRAM(s) IV Push once PRN bradycardia <40  LORazepam   Injectable 0.5milliGRAM(s) IV Push every 8 hours PRN Agitation    OPIATE NAIVE: (Y/N)  I STOP REVIEWED: (Y/N) : (#-------------------)   Review of Systems:     PAIN : (Y/N) (0-10)  AGITATION: (Y  SECRETIONS:(Y/N)  CONTIPATION : (Y/N)  DIARRHEA : (Y/N)  FRAILTY SYNDROM (Y/N):  FAILURE TO THRIVE (Y/N):  DIBILITY (Y  OTHER SYMPTOMS :  UNABLE TO OBTAIN DUE TO POOR MENTATION (   )    PHYSICAL EXAM:  T(F): 99.2, Max: 99.2 ( @ 11:33)  HR: 149 (58 - 149)  BP: 115/79 (115/79 - 156/55)  RR: 18 (16 - 18)  SpO2: 98% (90% - 98%)  Wt(kg): --   WEIGHT :                      BMI:  I&O's Summary    CAPILLARY BLOOD GLUCOSE      GENERAL: alert x[ ] oriented x ___o___[ ] lethargic [ ] agitated [x ] cachexia [ ] nonverbal [ ] coma [ ]  HEENT: normal [ ] dry mouth [ ] ET tube/trach [ ]   LUNGS: Comfortable [ ] tachypnea/ labored breathing[ ] excessive secretions [ ]  CV :  normal [ ] ramone  GI : normal [ ] distended [ ] tender [ ] no BS [ ]  PEG /NG tube [ ]   : normal [ ] incontinent [ ] oliguria/anuria [ ] felder [ ]  MSK : normal [ ] weakness [ ] edema [ ]              ambulatory [ ] bebbound/wheelchair bound [ ]   SKIN : normal [ ] pressure ulcer (Y/N) Stage ______________ rash (Y/N)    Functional Assessment:Karnofsky Performance Score :  Palliative Performance Status Version 2:         %    LABS:                        12.5   14.6  )-----------( 209      ( 2017 08:28 )             35.7     2017 08:28    143    |  109    |  16     ----------------------------<  103    3.5     |  23     |  0.95     Ca    8.3        2017 08:28  Phos  2.9       2017 06:38  Mg     2.1       2017 20:49    TPro  6.8    /  Alb  3.4    /  TBili  1.0    /  DBili  x      /  AST  30     /  ALT  20     /  AlkPhos  79     2017 21:23    PT/INR - ( 2017 22:21 )   PT: 12.4 sec;   INR: 1.11 ratio         PTT - ( 2017 22:21 )  PTT:30.3 sec  CARDIAC MARKERS ( 2017 08:28 )  .070 ng/mL / x     / 733 U/L / x     / 12.9 ng/mL  CARDIAC MARKERS ( 2017 20:49 )  .056 ng/mL / x     / 689 U/L / x     / x      CARDIAC MARKERS ( 2017 21:23 )  .023 ng/mL / x     / 213 U/L / x     / 3.5 ng/mL      l      Assessment:   89y Female admitted with SYNCOPE AND URINARY TRACT INFECTION  PATIENT FELL      PROBLEM LIST :  PROBLEM/RECOMMENDATION: 1  Problem : Goals of care, counseling/ discussion.  Recommendation: met with patient/ family and discussed GOC & Advanced care planning                                    Palliative care info/counseling provided (Y/N)                                      Family meeting                                   Advanced Directives addressed (Y/N)  PROBLEM/ RECOMMENDATION:2  Problem :Resuscitation/ DNR/ DNI,   Recommendation: DNR/ DNI    PROBLEM/ RECOMMENDATION :3  Problem : Medical order for life sustaning treatment  Recommendation : MOLST Form ( initiated/ completed)    PROBLEM/RECOMMENDATION :4  Problem : Advanced care planning  Recommendation : Family meeting.(Y/N)     PLAN:  REFFERALS:  Hospice (Y                       Palliative Med  (Y/N)                         Unit SW/Case Mgmt (Y/N)                          (Y/N)                         Speech/Swallow (Y/N)                         Patient/Family Support (Y/N)                                                Pain Management (Y/N)                         Nutrition (Y/N)                                                  PT/OT (Y/N)
Patient is a 89y old  Female who presents with a chief complaint of Fall (2017 03:40)      OVERNIGHT EVENTS: agitated as per report    MEDICATIONS  (STANDING):  docusate sodium 100milliGRAM(s) Oral three times a day  cefTRIAXone   IVPB 1Gram(s) IV Intermittent every 24 hours  heparin  Injectable 5000Unit(s) SubCutaneous every 12 hours  dextrose 5% + sodium chloride 0.45%. 1000milliLiter(s) IV Continuous <Continuous>    MEDICATIONS  (PRN):  atropine Injectable 0.5milliGRAM(s) IV Push once PRN bradycardia <40  LORazepam   Injectable 0.5milliGRAM(s) IV Push every 8 hours PRN Agitation    Vital Signs Last 24 Hrs  T(C): 37.3, Max: 37.3 ( @ 11:33)  T(F): 99.2, Max: 99.2 ( @ 11:33)  HR: 149 (62 - 149)  BP: 115/79 (115/79 - 156/55)  BP(mean): --  RR: 18 (16 - 18)  SpO2: 98% (90% - 98%)    PHYSICAL EXAM:  GENERAL: frail elderly lady,lying in bed, moving all extremities,opens eyes to verbal commands, mild agitation  HEAD:  hematoma in occipital area 2/2 to fall at home  CHEST/LUNG: Clear to percussion bilaterally   HEART: S1 S2+  ABDOMEN: Soft, Nontender, Nondistended; Bowel sounds present  EXTREMITIES:  No edema       LABS:                        12.5   14.6  )-----------( 209      ( 2017 08:28 )             35.7     2017 08:28    143    |  109    |  16     ----------------------------<  103    3.5     |  23     |  0.95     Ca    8.3        2017 08:28  Phos  2.9       2017 06:38  Mg     2.1       2017 20:49    TPro  6.8    /  Alb  3.4    /  TBili  1.0    /  DBili  x      /  AST  30     /  ALT  20     /  AlkPhos  79     2017 21:23    PT/INR - ( 2017 22:21 )   PT: 12.4 sec;   INR: 1.11 ratio         PTT - ( 2017 22:21 )  PTT:30.3 sec   cardiac markers Troponin .070    Troponin .056      Urinalysis Basic - ( 2017 22:34 )    Color: Yellow / Appearance: very cloudy / S.025 / pH: x  Gluc: x / Ketone: Moderate  / Bili: Small / Urobili: 1 mg/dL   Blood: x / Protein: 100 mg/dL / Nitrite: Negative   Leuk Esterase: Small / RBC: 6-10 /HPF / WBC 6-10   Sq Epi: x / Non Sq Epi: Moderate / Bacteria: Moderate      CAPILLARY BLOOD GLUCOSE    Cultures   Culture - Urine (17 @ 08:39)    Specimen Source: .Urine Clean Catch (Midstream)    Culture Results:   10,000 - 49,000 CFU/mL Enterococcus faecalis  Normal Urogenital scarlett present      RADIOLOGY & ADDITIONAL TESTS:    Imaging Personally Reviewed:  [x ] YES  [ ] NO    Consultant(s) Notes Reviewed:  [x ] YES  [ ] NO    Care Discussed with Consultants/Other Providers [x ] YES  [ ] NO

## 2017-01-26 NOTE — GOALS OF CARE CONVERSATION - PERSONAL ADVANCE DIRECTIVE - CONVERSATION DETAILS
89 yo f with PMH of alzheimer's BIBEMS when found down on bathroom floor. Patient with dementia and no family at bedside. Unable to obtain HPI or ROS. CT head: No acute intracranial hemorrhage, mass effect, or evidence of displaced calvarial fracture.  EK Afib. Questionable complete heart block on bedside monitor. Spoke to son Demetrius Valentin and discussed advanced care planning and goals of care. MOLST Form discussed and completed. DNR/ DNI, Comfort measures only. HOSPICE CARE EDUCATED .

## 2017-01-26 NOTE — PROGRESS NOTE ADULT - ASSESSMENT
pt  could not give any history
89 y/o F with PMH of alzheimer's dementia BIB EMS when found down on bathroom floor.by son
89 ym w adv dementia and <30 KPS, V poor PPS  agree w Hospice , son meeting w Hospice RN today

## 2017-01-26 NOTE — DISCHARGE NOTE ADULT - HOSPITAL COURSE
89 y/o F with PMH of alzheimer's dementia BIB EMS when found down on bathroom floor.by son  pt admitted, placed on telemetry, cardiology consuklted   syncope- mechanical fall vs likely cardiac source    -spoke to son Homero Romo([phone]) (pt's health care proxy) who clearly stated that he does not want any invasive interventions or agrresive treatment options not limited to EPstudy/pacemaker . He opted for only comfort care ,stated "I don't want my mom to go through any pain at this age, make her comfortable". He agreed for palliative care, consult requested, d/c to inpatient hospice 89 y/o F with PMH of alzheimer's dementia BIB EMS when found down on bathroom floor.by son  pt admitted, placed on telemetry, cardiology consuklted   syncope- mechanical fall vs likely cardiac source    -spoke to son Homero Romo([phone]) (pt's health care proxy) who clearly stated that he does not want any invasive interventions or agrresive treatment options not limited to EPstudy/pacemaker . He opted for only comfort care ,stated "I don't want my mom to go through any pain at this age, make her comfortable". He agreed for palliative care, consult requested, d/c to inpatient hospice   Addendum- synope - likely 2/2 to Complete heart block vs mecahnical fall

## 2017-01-26 NOTE — DISCHARGE NOTE ADULT - MEDICATION SUMMARY - MEDICATIONS TO STOP TAKING
I will STOP taking the medications listed below when I get home from the hospital:    enoxaparin  -- 30 milligram(s) subcutaneous once a day    cephalexin 250 mg oral capsule  -- 1 cap(s) by mouth every 6 hours

## 2017-01-26 NOTE — DISCHARGE NOTE ADULT - MEDICATION SUMMARY - MEDICATIONS TO TAKE
I will START or STAY ON the medications listed below when I get home from the hospital:    LORazepam  -- 0.5 milligram(s) intravenous every 8 hours, As Needed  agitation  -- Indication: For Agitation

## 2017-01-26 NOTE — PROGRESS NOTE ADULT - PROBLEM SELECTOR PROBLEM 2
Alzheimer's disease of other onset without behavioral disturbance
Cardiac arrhythmia, unspecified cardiac arrhythmia type

## 2017-01-27 NOTE — H&P ADULT. - HISTORY OF PRESENT ILLNESS
89 year old female transferred from in patient [facility] to hospice status post fall with end stage Alzheimer's dementia and  UTI, HTN and arhythmia 89 year old female transferred from in patient [facility] to hospice status post fall with end stage Alzheimer's dementia and  UTI, HTN and arrhythmia

## 2017-01-27 NOTE — PROGRESS NOTE ADULT - SUBJECTIVE AND OBJECTIVE BOX
PIERCE RAPHAEL                    89y  Female    Allergies    No Known Allergies    Intolerances        Symptoms:  Pain (1-10):  on IV morphine  Dyspnea:  on oxygen  Nausea/Vomiting:  no  Secretions:  no  Agitation:  yes on IV ativan  Symptom Requiring Inpatient Hospice Admission:  pain and agitation    Overnight events/interim history:    HPI:  89 [facility] fall with end stage Alzheimer's dementia and  UTI, HTN and arrhythmia (27 Jan 2017 07:29)          PPSV2:     Code Status:  DNR          MEDICATIONS  (STANDING):  dextrose 5% + sodium chloride 0.45%. 1000milliLiter(s) IV Continuous <Continuous>  LORazepam   Injectable 0.5milliGRAM(s) IV Push every 6 hours  cefTRIAXone   IVPB 1Gram(s) IV Intermittent every 24 hours  heparin  Injectable 5000Unit(s) SubCutaneous every 12 hours    MEDICATIONS  (PRN):  morphine  - Injectable 2milliGRAM(s) IV Push every 4 hours PRN Severe Pain (7 - 10)  bisacodyl Suppository 10milliGRAM(s) Rectal daily PRN Constipation      CBC Full  -  ( 26 Jan 2017 08:28 )  WBC Count : 14.6 K/uL  Hemoglobin : 12.5 g/dL  Hematocrit : 35.7 %  Platelet Count - Automated : 209 K/uL  Mean Cell Volume : 87.4 fl  Mean Cell Hemoglobin : 30.6 pg  Mean Cell Hemoglobin Concentration : 35.1 gm/dL  Auto Neutrophil # : x  Auto Lymphocyte # : x  Auto Monocyte # : x  Auto Eosinophil # : x  Auto Basophil # : x  Auto Neutrophil % : x  Auto Lymphocyte % : x  Auto Monocyte % : x  Auto Eosinophil % : x  Auto Basophil % : x                         Vital Signs Last 24 Hrs  T(C): 37, Max: 37 (01-27 @ 08:07)  T(F): 98.6, Max: 98.6 (01-27 @ 08:07)  HR: 91 (63 - 91)  BP: 87/42 (87/42 - 134/82)  BP(mean): --  RR: 20 (18 - 20)  SpO2: 97% (93% - 97%)    PHYSICAL EXAM:      Constitutional: elderly frail    Eyes: wnl    ENMT:  mouth dry    Neck:supple    Breasts  :without masses    Back:     Respiratory:  diminished breath sounds bilaterally    Cardiovascular:regular    Gastrointestinal: abdomen soft non tender    Genitourinary:  felder    Rectal:  not examined    Extremities:  fragile skin    Vascular:    Neurological:  lethargic    Skin  :fragile    Lymph Nodes:  none palpable    Musculoskeletal:  weakness    Psychiatric:

## 2017-01-28 NOTE — PROGRESS NOTE ADULT - SUBJECTIVE AND OBJECTIVE BOX
PIERCE RAPHAEL                    89y  Female    Allergies    No Known Allergies    Intolerances        Symptoms:  Pain (1-10):  no  Dyspnea:  no  Nausea/Vomiting:  no  Secretions:   no  Agitation:  no  Symptom Requiring Inpatient Hospice Admission  :on IV rocephin   febrile    Overnight events/interim history:    HPI:  89 year old female transferred from in patient [facility] to hospice status post fall with end stage Alzheimer's dementia and  UTI, HTN and arrhythmia (27 Jan 2017 07:29)          PPSV2:     Code Status:  DNR          MEDICATIONS  (STANDING):  dextrose 5% + sodium chloride 0.45%. 1000milliLiter(s) IV Continuous <Continuous>  LORazepam   Injectable 0.5milliGRAM(s) IV Push every 6 hours  cefTRIAXone   IVPB 1Gram(s) IV Intermittent every 24 hours  heparin  Injectable 5000Unit(s) SubCutaneous every 12 hours    MEDICATIONS  (PRN):  morphine  - Injectable 2milliGRAM(s) IV Push every 4 hours PRN Severe Pain (7 - 10)  bisacodyl Suppository 10milliGRAM(s) Rectal daily PRN Constipation                             Vital Signs Last 24 Hrs  T(C): 37.9, Max: 37.9 (01-28 @ 08:02)  T(F): 100.2, Max: 100.2 (01-28 @ 08:02)  HR: 49 (49 - 95)  BP: 136/54 (133/35 - 136/54)  BP(mean): --  RR: 20 (20 - 20)  SpO2: 95% (94% - 95%)    PHYSICAL EXAM:      Constitutional:  elderly frail    Eyes:  conjuctiva pale    ENMT:  dry mucous membranes    Neck: supple    Breasts:  without masses    Back:  redness    Respiratory:  coarsely transmitted breath sounds bilaterally    Cardiovascular:  regular    Gastrointestinal: soft non tender    Genitourinary: felder    Rectal:  deferred    Extremities:  without edema    Vascular:    Neurological:  lethargic    Skin:  fragile    Lymph Nodes: nlone    Musculoskeletal:  weakness    Psychiatric:  lethargic

## 2017-01-29 NOTE — PROGRESS NOTE ADULT - SUBJECTIVE AND OBJECTIVE BOX
ROXYPIERCE JOHNSON                    89y  Female    Allergies    No Known Allergies    Intolerances        Symptoms:  lethargy  Pain (1-10):  on IV morphine  Dyspnea:  no  Nausea/Vomiting:  no  Secretions: no  Agitation:  yes  Symptom Requiring Inpatient Hospice Admission:  fever, IV antibiotics   IV ativan    Overnight events/interim history:  fever spike    HPI:  89 year old female transferred from in patient [facility] to hospice status post fall with end stage Alzheimer's dementia and  UTI, HTN and arrhythmia (27 Jan 2017 07:29)          PPSV2:     Code Status:  DNR          MEDICATIONS  (STANDING):  dextrose 5% + sodium chloride 0.45%. 1000milliLiter(s) IV Continuous <Continuous>  LORazepam   Injectable 0.5milliGRAM(s) IV Push every 6 hours  cefTRIAXone   IVPB 1Gram(s) IV Intermittent every 24 hours  heparin  Injectable 5000Unit(s) SubCutaneous every 12 hours    MEDICATIONS  (PRN):  morphine  - Injectable 2milliGRAM(s) IV Push every 4 hours PRN Severe Pain (7 - 10)  bisacodyl Suppository 10milliGRAM(s) Rectal daily PRN Constipation  acetaminophen  Suppository 650milliGRAM(s) Rectal every 6 hours PRN For Temp greater than 38 C (100.4 F)                             Vital Signs Last 24 Hrs  T(C): 38.6, Max: 38.6 (01-28 @ 11:55)  T(F): 101.5, Max: 101.5 (01-28 @ 11:55)  HR: 89 (49 - 89)  BP: 172/82 (97/37 - 172/82)  BP(mean): --  RR: 20 (18 - 20)  SpO2: 95% (94% - 95%)    PHYSICAL EXAM:        Constitutional:   frail elderly    Eyes:  shut    ENMT:  dry    Neck:   supple    Breasts:    Back:  wnl    Respiratory: coarsely transmitted breath sounds bilaterally    Cardiovascular:   regular    Gastrointestinal:  no BM    Genitourinary:   felder    Rectal:  deferred    Extremities:  two staples noted on right thigh removed without incident    Vascular:    Neurological:  lethargic    Skin:  intact    Lymph Nodes:  none palpable    Musculoskeletal:  weakness    Psychiatric: lethargic

## 2017-01-30 NOTE — PROGRESS NOTE ADULT - PROBLEM SELECTOR PLAN 1
hospice GIP level of care for management of agitation, dyspnea, and for IV abx tx for sepsis and high fever Tmax 103.2

## 2017-01-30 NOTE — PROGRESS NOTE ADULT - SUBJECTIVE AND OBJECTIVE BOX
ROXYELIZABETHPIERCE                    89y  Female    Allergies    No Known Allergies      Symptoms:  Pain (1-10): ? agitation, behavioral signs of discomfort  Dyspnea: yes  Nausea/Vomiting: no  Secretions: yes  Agitation: yes  Symptom Requiring Inpatient Hospice Admission: fever, agitation    Overnight events/interim history: Pt continues to require IV managment of symptoms of agitation and dyspnea. She has required 4 doses of IV morphine and 4 doses of IV Ativan x 24 hr.  She is febrile, bradycardic, tachypeneic. Continues on IV abx. Unresponsive. Not taking PO            PPSV2: 10%    Code Status:  DNR          MEDICATIONS  (STANDING):  LORazepam   Injectable 0.5milliGRAM(s) IV Push every 6 hours  cefTRIAXone   IVPB 1Gram(s) IV Intermittent every 24 hours    MEDICATIONS  (PRN):  morphine  - Injectable 2milliGRAM(s) IV Push every 4 hours PRN Severe Pain (7 - 10)  bisacodyl Suppository 10milliGRAM(s) Rectal daily PRN Constipation  acetaminophen  Suppository 650milliGRAM(s) Rectal every 6 hours PRN For Temp greater than 38 C (100.4 F)  glycopyrrolate Injectable 0.4milliGRAM(s) IV Push every 6 hours PRN excessive posterior pharyngeal secretions                             Vital Signs Last 24 Hrs  T(C): 38.2, Max: 39.7 (01-30 @ 08:05)  T(F): 100.7, Max: 103.5 (01-30 @ 08:05)  HR: 44 (44 - 81)  BP: 126/40 (126/40 - 135/35)  BP(mean): --  RR: 28 (16 - 28)  SpO2: 92% (91% - 92%)    PHYSICAL EXAM:      Constitutional: pale    Eyes:    ENMT:    Neck:    Breasts:    Back:    Respiratory:    Cardiovascular:    Gastrointestinal:    Genitourinary:    Rectal:    Extremities:    Vascular:    Neurological:    Skin:    Lymph Nodes:    Musculoskeletal:    Psychiatric: ROXYELIZABETH PIERCE                    89y  Female    Allergies    No Known Allergies      Symptoms:  Pain (1-10): ? agitation, behavioral signs of discomfort  Dyspnea: yes  Nausea/Vomiting: no  Secretions: yes  Agitation: yes  Symptom Requiring Inpatient Hospice Admission: fever, agitation    Overnight events/interim history: Pt continues to require IV managment of symptoms of agitation and dyspnea. She has required 4 doses of IV morphine and 4 doses of IV Ativan x 24 hr.  She is febrile, bradycardic, tachypeneic. Continues on IV abx. Unresponsive. Not taking PO            PPSV2: 10%    Code Status:  DNR          MEDICATIONS  (STANDING):  LORazepam   Injectable 0.5milliGRAM(s) IV Push every 6 hours  cefTRIAXone   IVPB 1Gram(s) IV Intermittent every 24 hours    MEDICATIONS  (PRN):  morphine  - Injectable 2milliGRAM(s) IV Push every 4 hours PRN Severe Pain (7 - 10)  bisacodyl Suppository 10milliGRAM(s) Rectal daily PRN Constipation  acetaminophen  Suppository 650milliGRAM(s) Rectal every 6 hours PRN For Temp greater than 38 C (100.4 F)  glycopyrrolate Injectable 0.4milliGRAM(s) IV Push every 6 hours PRN excessive posterior pharyngeal secretions                             Vital Signs Last 24 Hrs  T(C): 38.2, Max: 39.7 (01-30 @ 08:05)  T(F): 100.7, Max: 103.5 (01-30 @ 08:05)  HR: 44 (44 - 81)  BP: 126/40 (126/40 - 135/35)  BP(mean): --  RR: 28 (16 - 28)  SpO2: 92% (91% - 92%)    PHYSICAL EXAM:      Constitutional: pale    Eyes:  pupils sluggish    ENMT: mucosa dry    Respiratory: respirations labored, coarse bilateral breath sounds,     Cardiovascular: bradycardic, no m/r, reg S1S2    Gastrointestinal: abdomen soft, n/t  BS+    Genitourinary: reduced UO    Extremities: warm, no mottling, no edema    Neurological: unresponsive, retracts to deep pain

## 2017-01-30 NOTE — PROGRESS NOTE ADULT - ASSESSMENT
Overnight events/interim history: Pt continues to require IV managment of symptoms of agitation and dyspnea. She has required 4 doses of IV morphine and 4 doses of IV Ativan x 24 hr.  She continues on IV abx    HPI:  89 year old female transferred from in patient [facility] to hospice status post fall with end stage Alzheimer's dementia and  UTI, HTN and arrhythmia (27 Jan 2017 07:29) 89 year old female transferred from inpatient [facility] to hospice s/p fall.  while on medical unit telemetry pt noted to be having cardiac arrythmia. Family decided against PPM placement or any further aggressive intervention and they have opted for hospice measures only. On inpt unit pt continues to require IV management of symptoms of agitation and dyspnea. She has required 4 doses of IV morphine and 4 doses of IV Ativan x 24 hr.  She is febrile and she continues on IV abx. Goals of care discussion with son Deandre, at bedside. Discussed pt status, dying process. He reiterates that he wishes for comfort measures only.

## 2017-01-30 NOTE — PROGRESS NOTE ADULT - PROBLEM SELECTOR PLAN 3
Family declined further workup or intervention  O2 and comfort measures  morphine PRN dyspnea  Robinul PRN secretions

## 2017-01-31 NOTE — PROGRESS NOTE ADULT - SUBJECTIVE AND OBJECTIVE BOX
PIERCE RAPHAEL                    89y  Female    Allergies    No Known Allergies    Intolerances        Symptoms:  Pain (1-10):  controlled  Dyspnea:   controlled  Nausea/Vomiting:  no  Secretions: no  Agitation:  intermittent  Symptom Requiring Inpatient Hospice Admission:  no oral route on IV medication    Overnight events/interim history:  continues to decline    HPI:  89 year old female transferred from in patient [facility] to hospice status post fall with end stage Alzheimer's dementia and  UTI, HTN and arrhythmia (27 Jan 2017 07:29)          PPSV2:     Code Status: DNR          MEDICATIONS  (STANDING):  cefTRIAXone   IVPB 1Gram(s) IV Intermittent every 24 hours  LORazepam   Injectable 0.5milliGRAM(s) IV Push every 6 hours    MEDICATIONS  (PRN):  bisacodyl Suppository 10milliGRAM(s) Rectal daily PRN Constipation  acetaminophen  Suppository 650milliGRAM(s) Rectal every 6 hours PRN For Temp greater than 38 C (100.4 F)  glycopyrrolate Injectable 0.4milliGRAM(s) IV Push every 6 hours PRN excessive posterior pharyngeal secretions  morphine  - Injectable 2milliGRAM(s) IV Push every 4 hours PRN Severe Pain (7 - 10)                             Vital Signs Last 24 Hrs  T(C): 36.8, Max: 38.2 (01-30 @ 11:54)  T(F): 98.3, Max: 100.7 (01-30 @ 11:54)  HR: 52 (50 - 52)  BP: 126/51 (112/38 - 126/51)  BP(mean): --  RR: 22 (16 - 22)  SpO2: 96% (91% - 96%)    PHYSICAL EXAM:      Constitutional:  frail    Eyes:  not examined    ENMT:  dry    Neck:  supple    Breasts:  without masses    Back:  skin redness    Respiratory:  coarsely transmitted breath sounds    Cardiovascular:  regular    Gastrointestinal:  no oral intake abdomen soft non tender    Genitourinary:  felder    Rectal:  not examined    Extremities: without edema    Vascular:    Neurological:  unchanged    Skin: grossly intact  Lymph Nodes:  none palpable    Musculoskeletal:  weakness    Psychiatric:  N/A

## 2017-02-01 NOTE — DIETITIAN INITIAL EVALUATION ADULT. - OTHER INFO
Pt seen for Hospice admission. Unable to obtain wt & diet hx due to lethargy. Pt edentulous remains NPO x 6 days receiving comfort care. Last BM x 1 (1/30).

## 2017-02-01 NOTE — PROGRESS NOTE ADULT - SUBJECTIVE AND OBJECTIVE BOX
PIERCE RAPHAEL                    89y  Female    Allergies    No Known Allergies    Intolerances        Symptoms:  Pain (1-10):  on morphine  Dyspnea:  no  Nausea/Vomiting:  no  Secretions: no  Agitation: no  Symptom Requiring Inpatient Hospice Admission: no oral intake    Overnight events/interim history:    HPI:  89 year old female transferred from in patient [facility] to hospice status post fall with end stage Alzheimer's dementia and  UTI, HTN and arrhythmia (27 Jan 2017 07:29)          PPSV2:     Code Status:  DNR          MEDICATIONS  (STANDING):  cefTRIAXone   IVPB 1Gram(s) IV Intermittent every 24 hours  LORazepam   Injectable 0.5milliGRAM(s) IV Push every 6 hours    MEDICATIONS  (PRN):  bisacodyl Suppository 10milliGRAM(s) Rectal daily PRN Constipation  acetaminophen  Suppository 650milliGRAM(s) Rectal every 6 hours PRN For Temp greater than 38 C (100.4 F)  glycopyrrolate Injectable 0.4milliGRAM(s) IV Push every 6 hours PRN excessive posterior pharyngeal secretions  morphine  - Injectable 2milliGRAM(s) IV Push every 4 hours PRN Severe Pain (7 - 10)                             Vital Signs Last 24 Hrs  T(C): 36.6, Max: 37.1 (01-31 @ 19:57)  T(F): 97.8, Max: 98.8 (01-31 @ 19:57)  HR: 41 (40 - 41)  BP: 158/72 (153/45 - 158/72)  BP(mean): --  RR: 22 (16 - 22)  SpO2: 96% (90% - 96%)    PHYSICAL EXAM:      Constitutional:frail    Eyes:  closed    ENMT:  dry    Neck:  supple    Breasts:  without masses    Back:    Respiratory: diminished breath sounds bilaterally    Cardiovascular: regular    Gastrointestinal: abdomen soft non tender    Genitourinary:    Rectal: not examined    Extremities: without edema    Vascular:    Neurological:  lethargic    Skin: fragile    Lymph Nodes:    Musculoskeletal: weakness    Psychiatric:

## 2017-02-02 NOTE — PROGRESS NOTE ADULT - PROBLEM SELECTOR PLAN 1
Hospice GIP level of care for management of pain, dyspnea, and agitation while pt unable to take PO.  Continue lorazepam IV ATC and morphine IV PRN for sx management  At present moment pt is relaxed, arousable, in NAD  DNR  PPSV2 10%

## 2017-02-02 NOTE — PROGRESS NOTE ADULT - SUBJECTIVE AND OBJECTIVE BOX
PIERCE RAPHAEL                    89y  Female    Allergies    No Known Allergies    Symptoms:  Pain (1-10): presumed due to behavioral signs and symptoms of pain  Dyspnea:  yes, intermittently.  O2 is in use  Nausea/Vomiting: no  Secretions: no  Agitation: yes  Symptom Requiring Inpatient Hospice Admission: agitation, no PO route    Overnight events/interim history:  Pt continues to receive lorazepam IV ATC and has received 3 doses of morphine 2 mg IV in 24 hr for management of pain and dyspnea.       PPSV2: 10    Code Status: DNR          MEDICATIONS  (STANDING):  cefTRIAXone   IVPB 1Gram(s) IV Intermittent every 24 hours  LORazepam   Injectable 0.5milliGRAM(s) IV Push every 6 hours    MEDICATIONS  (PRN):  bisacodyl Suppository 10milliGRAM(s) Rectal daily PRN Constipation  acetaminophen  Suppository 650milliGRAM(s) Rectal every 6 hours PRN For Temp greater than 38 C (100.4 F)  glycopyrrolate Injectable 0.4milliGRAM(s) IV Push every 6 hours PRN excessive posterior pharyngeal secretions  morphine  - Injectable 2milliGRAM(s) IV Push every 4 hours PRN Severe Pain (7 - 10)                             Vital Signs Last 24 Hrs  T(C): 36.8, Max: 36.8 (02-02 @ 08:18)  T(F): 98.2, Max: 98.2 (02-02 @ 08:18)  HR: 40 (40 - 40)  BP: 193/58 (168/53 - 193/58)  BP(mean): --  RR: 20 (16 - 20)  SpO2: 96% (93% - 96%)    PHYSICAL EXAM:      Constitutional:  frail, elderly female    Eyes: sluggish pupillary response    Neck: no JVD    Back:  large sacral decub, foul smelling exudate    Respiratory:  mild tachypnea, mildly labored respirations, coarse bilateral breath sounds    Cardiovascular: bradycardic 40's, +murmur    Gastrointestinal: abd soft and n/t, hypoactive bowel sounds    Extremities:  warm and dry, no clubbing, cyanosis, mottling, edema    Vascular:  difficult to appreciate peripheral pulses    Neurological: arounses to verbal+tactile stim, does not open eyes, confused and garbled speech, falls back to sleep quickly    Skin: sacral decub

## 2017-02-02 NOTE — PROGRESS NOTE ADULT - ASSESSMENT
89 year old female transferred from inpatient [facility] to hospice s/p fall with severe bradyarrhythmia and no plan for intervention/PPM. Transferred to hospice [facility] for management of agitation with IV meds, pt is no longer taking PO. Pt continues to receive lorazepam IV ATC and has received 3 doses of morphine 2 mg IV in 24 hr for management of pain and dyspnea.

## 2017-02-03 NOTE — PROGRESS NOTE ADULT - SUBJECTIVE AND OBJECTIVE BOX
PIERCE RAPHAEL                    89y  Female    Allergies    No Known Allergies    Intolerances        Symptoms:  Pain (1-10):  on morphine IV   Dyspnea:  no  Nausea/Vomiting:  none  Secretions:  none  Agitation:  ON iV ativan around the clock  Symptom Requiring Inpatient Hospice Admission:  no oral route for medication    Overnight events/interim history:    HPI:  89 year old female transferred from in patient [facility] to hospice status post fall with end stage Alzheimer's dementia and  UTI, HTN and arrhythmia (27 Jan 2017 07:29)          PPSV2:     Code Status:  DNR          MEDICATIONS  (STANDING):  cefTRIAXone   IVPB 1Gram(s) IV Intermittent every 24 hours  LORazepam   Injectable 0.5milliGRAM(s) IV Push every 6 hours    MEDICATIONS  (PRN):  bisacodyl Suppository 10milliGRAM(s) Rectal daily PRN Constipation  acetaminophen  Suppository 650milliGRAM(s) Rectal every 6 hours PRN For Temp greater than 38 C (100.4 F)  glycopyrrolate Injectable 0.4milliGRAM(s) IV Push every 6 hours PRN excessive posterior pharyngeal secretions  morphine  - Injectable 2milliGRAM(s) IV Push every 4 hours PRN Severe Pain (7 - 10)                             Vital Signs Last 24 Hrs  T(C): 37.4, Max: 37.4 (02-03 @ 07:56)  T(F): 99.4, Max: 99.4 (02-03 @ 07:56)  HR: 43 (43 - 43)  BP: 132/40 (132/40 - 137/71)  BP(mean): --  RR: 18 (18 - 18)  SpO2: 94% (94% - 94%)    PHYSICAL EXAM:      Constitutional:  frail    Eyes:  shut    ENMT:  dry    Neck:  supple    Breasts:  without masses    Back:  redness    Respiratory:  diminished breath sounds bilaterally    Cardiovascular: bradycardic    Gastrointestinal: abdomen soft     Genitourinary:  felder    Rectal:  deferred    Extremities:without edema    Vascular:    Neurological:  mostly lethargic    Skin:  dry    Lymph Nodes:  none palpable    Musculoskeletal:  weakness    Psychiatric:N/A

## 2017-02-04 NOTE — PROGRESS NOTE ADULT - SUBJECTIVE AND OBJECTIVE BOX
PIERCE RAPHAEL                    89y  Female    Allergies    No Known Allergies    Intolerances        Symptoms:  Pain (1-10):  on morphine IV  Dyspnea:   no  Nausea/Vomiting:  no  Secretions:   no  Agitation:  moaning  Symptom Requiring Inpatient Hospice Admission: was on IV antibiotic stopped today  no oral route    Overnight events/interim history:    HPI:  89 year old female transferred from in patient [facility] to hospice status post fall with end stage Alzheimer's dementia and  UTI, HTN and arrhythmia (27 Jan 2017 07:29)          PPSV2:     Code Status:  DNR          MEDICATIONS  (STANDING):  LORazepam   Injectable 0.5milliGRAM(s) IV Push every 6 hours    MEDICATIONS  (PRN):  bisacodyl Suppository 10milliGRAM(s) Rectal daily PRN Constipation  acetaminophen  Suppository 650milliGRAM(s) Rectal every 6 hours PRN For Temp greater than 38 C (100.4 F)  glycopyrrolate Injectable 0.4milliGRAM(s) IV Push every 6 hours PRN excessive posterior pharyngeal secretions  morphine  - Injectable 2milliGRAM(s) IV Push every 4 hours PRN Severe Pain (7 - 10)                             Vital Signs Last 24 Hrs  T(C): 37.3, Max: 37.4 (02-03 @ 07:56)  T(F): 99.2, Max: 99.4 (02-03 @ 07:56)  HR: 45 (43 - 45)  BP: 173/50 (132/40 - 173/50)  BP(mean): --  RR: 16 (16 - 18)  SpO2: 93% (93% - 94%)    PHYSICAL EXAM:      Constitutional:  frail dry    Eyes:  closed     ENT:  dry mucous membranes    Neck: supple    Breasts:  without masses    Back: redness    Respiratory:  diminished breath sounds bilaterally    Cardiovascular:  bradycardic    Gastrointestinal:  abdomen soft     Genitourinary:  not examined    Rectal:  not examined    Extremities: without edema    Vascular:    Neurological: lethargic moaning    Skin:  dry    Lymph Nodes:  none palpable    Musculoskeletal:  muscle wasting    Psychiatric:  moaning

## 2017-02-05 NOTE — PROGRESS NOTE ADULT - SUBJECTIVE AND OBJECTIVE BOX
PIERCE RAPHAEL                    89y  Female    Allergies    No Known Allergies    Intolerances        Symptoms:  Pain (1-10):  well controlled  Dyspnea:  on continuous oxygen  Nausea/Vomiting:  none  Secretions:   none  Agitation:intermittent moaning  Symptom Requiring Inpatient Hospice Admission:  no oral route for medications    Overnight events/interim history:    HPI:  89 year old female transferred from in patient [facility] to hospice status post fall with end stage Alzheimer's dementia and  UTI, HTN and arrhythmia (27 Jan 2017 07:29)          PPSV2:     Code Status :DNR          MEDICATIONS  (STANDING):  LORazepam   Injectable 0.5milliGRAM(s) IV Push every 6 hours    MEDICATIONS  (PRN):  bisacodyl Suppository 10milliGRAM(s) Rectal daily PRN Constipation  acetaminophen  Suppository 650milliGRAM(s) Rectal every 6 hours PRN For Temp greater than 38 C (100.4 F)  glycopyrrolate Injectable 0.4milliGRAM(s) IV Push every 6 hours PRN excessive posterior pharyngeal secretions  morphine  - Injectable 2milliGRAM(s) IV Push every 4 hours PRN Severe Pain (7 - 10)                             Vital Signs Last 24 Hrs  T(C): 36.4, Max: 36.4 (02-05 @ 07:48)  T(F): 97.6, Max: 97.6 (02-05 @ 07:48)  HR: 50 (45 - 50)  BP: 130/51 (130/51 - 203/68)  BP(mean): --  RR: 18 (16 - 18)  SpO2: 93% (93% - 98%)    PHYSICAL EXAM:      Constitutional:  frail cachectic    Eyes:  closed    ENMT:  dry mucus membranes    Neck:  supple    Breasts:  without masses    Back:  redness    Respiratory:  coarsely transmitted breath sounds bilaterally    Cardiovascular:  bradycardic    Gastrointestinal:  abdomen soft non tender    Genitourinary: felder    Rectal: not examined    Extremities:  warm without edema    Vascular:    Neurological: lethargic    Skin:dry    Lymph Nodes:  none palpable    Musculoskeletal:  muscle wqasting    Psychiatric:

## 2017-02-06 NOTE — PROGRESS NOTE ADULT - SUBJECTIVE AND OBJECTIVE BOX
PIERCE RAPHAEL                       [id number]  89yFemale    Allergies    No Known Allergies    Intolerances        HPI:  89 year old female transferred from in patient [facility] to hospice status post fall with end stage Alzheimer's dementia and  UTI, HTN and arrhythmia (27 Jan 2017 07:29)          Last 24 hours:    MEDICATIONS  (STANDING):  LORazepam   Injectable 0.5milliGRAM(s) IV Push every 6 hours    MEDICATIONS  (PRN):  bisacodyl Suppository 10milliGRAM(s) Rectal daily PRN Constipation  acetaminophen  Suppository 650milliGRAM(s) Rectal every 6 hours PRN For Temp greater than 38 C (100.4 F)  glycopyrrolate Injectable 0.4milliGRAM(s) IV Push every 6 hours PRN excessive posterior pharyngeal secretions  morphine  - Injectable 2milliGRAM(s) IV Push every 4 hours PRN Severe Pain (7 - 10)                                      PHYSICAL EXAM:      Constitutional:  frail    Eyes:  shut    ENMT:  dry     Neck:  supple    Breasts:  without masses    Back:  redness    Respiratory:  coarsely transmitted breath sounds bilaterally    Cardiovascular:  regular    Gastrointestinal:  abdomen soft non tender    Genitourinary:  felder    Rectal:  not examined    Extremities: without edema    Vascular:    Neurological: lethargic    Skin:  dry    Lymph Nodes:  none palpable    Musculoskeletal:  wasting    Psychiatric:

## 2017-02-07 NOTE — PROGRESS NOTE ADULT - SUBJECTIVE AND OBJECTIVE BOX
PIERCE RAPHAEL                    89y  Female    Allergies    No Known Allergies    Intolerances        Symptoms:  Pain (1-10):  on ativan and morphine  Dyspnea: some  Nausea/Vomiting:  none  Secretions:   none  Agitation: intermittent  Symptom Requiring Inpatient Hospice Admission:  no oral route for medication    Overnight events/interim history:    HPI:  89 year old female transferred from in patient [facility] to hospice status post fall with end stage Alzheimer's dementia and  UTI, HTN and arrhythmia (27 Jan 2017 07:29)          PPSV2:      Code Status:  DNR          MEDICATIONS  (STANDING):  LORazepam   Injectable 0.5milliGRAM(s) IV Push every 6 hours    MEDICATIONS  (PRN):  bisacodyl Suppository 10milliGRAM(s) Rectal daily PRN Constipation  acetaminophen  Suppository 650milliGRAM(s) Rectal every 6 hours PRN For Temp greater than 38 C (100.4 F)  glycopyrrolate Injectable 0.4milliGRAM(s) IV Push every 6 hours PRN excessive posterior pharyngeal secretions  morphine  - Injectable 2milliGRAM(s) IV Push every 4 hours PRN Severe Pain (7 - 10)                             Vital Signs Last 24 Hrs  T(C): 37.1, Max: 37.1 (02-07 @ 07:51)  T(F): 98.7, Max: 98.7 (02-07 @ 07:51)  HR: 47 (45 - 47)  BP: 169/46 (153/53 - 169/46)  BP(mean): --  RR: 16 (16 - 16)  SpO2: 95% (90% - 95%)    PHYSICAL EXAM:      Constitutional:  frail cachectic    Eyes:  shut    ENMT:  dry    Neck:  supple    Breasts:  without masses    Back:  redness    Respiratory:  diminished breath sounds at bases    Cardiovascular:   bradycardic    Gastrointestinal:  soft non tender    Genitourinary: not examined    Rectal: not examined    Extremities:  without edema    Vascular:    Neurological:  lethargic    Skin:  dry    Lymph Nodes:  none palpable    Musculoskeletal:  muscle wasting    Psychiatric:

## 2017-02-08 NOTE — PROGRESS NOTE ADULT - SUBJECTIVE AND OBJECTIVE BOX
PIERCE RAPHAEL                    89y  Female    Allergies    No Known Allergies    Intolerances        Symptoms:  Pain (1-10):  appears to be in pain  Dyspnea:  on oxygen  Nausea/Vomiting:  no  Secretions:  no  Agitation:  occasional  Symptom Requiring Inpatient Hospice Admission:  all IV medications around the clock morphine    Overnight events/interim history:    HPI:  89 year old female transferred from in patient [facility] to hospice status post fall with end stage Alzheimer's dementia and  UTI, HTN and arrhythmia (27 Jan 2017 07:29)          PPSV2:     Code Status:  DNR          MEDICATIONS  (STANDING):  LORazepam   Injectable 0.5milliGRAM(s) IV Push every 6 hours  morphine  - Injectable 2milliGRAM(s) IV Push every 4 hours    MEDICATIONS  (PRN):  bisacodyl Suppository 10milliGRAM(s) Rectal daily PRN Constipation  acetaminophen  Suppository 650milliGRAM(s) Rectal every 6 hours PRN For Temp greater than 38 C (100.4 F)  glycopyrrolate Injectable 0.4milliGRAM(s) IV Push every 6 hours PRN excessive posterior pharyngeal secretions                             Vital Signs Last 24 Hrs  T(C): 37.6, Max: 37.6 (02-08 @ 07:45)  T(F): 99.7, Max: 99.7 (02-08 @ 07:45)  HR: 46 (44 - 46)  BP: 124/34 (124/34 - 143/46)  BP(mean): --  RR: 16 (16 - 16)  SpO2: 98% (96% - 98%)    PHYSICAL EXAM:      Constitutional: frail cachectic    Eyes:  shut    ENMT:  dry    Neck:  supple    Breasts:   without masses    Back:  reddened areas    Respiratory :diminished breath sounds bilaterally    Cardiovascular:  bradycardic    Gastrointestinal:  abdomen soft non tender    Genitourinary:  felder    Rectal:  not examined    Extremities:  trace edema not pitting    Vascular:    Neurological:  lethargic    Skin:  fragile    Lymph Nodes:  none palpable    Musculoskeletal:  muscle wasting    Psychiatric:  N/A

## 2017-02-09 PROBLEM — G30.9 ALZHEIMER'S DISEASE, UNSPECIFIED: Chronic | Status: ACTIVE | Noted: 2017-01-01

## 2017-02-09 NOTE — DISCHARGE NOTE FOR THE EXPIRED PATIENT - OTHER SIGNIFICANT FINDINGS
[facility] Medical Examiner's Office was contacted at 6:43 AM to review case as pt was brought to hospital s/p fall and after hearing the brief details of the case states that this is not an ME case.  Pt had neg head CT. Fall seems to have been related to underlying bradyarrhythmia  Per ME office it is not an ME case because there was no trauma.  Staff member at ME office would not provide his name.

## 2017-02-13 DIAGNOSIS — I49.9 CARDIAC ARRHYTHMIA, UNSPECIFIED: ICD-10-CM

## 2017-02-13 DIAGNOSIS — G30.9 ALZHEIMER'S DISEASE, UNSPECIFIED: ICD-10-CM

## 2017-02-13 DIAGNOSIS — Z66 DO NOT RESUSCITATE: ICD-10-CM

## 2017-02-13 DIAGNOSIS — F02.80 DEMENTIA IN OTHER DISEASES CLASSIFIED ELSEWHERE, UNSPECIFIED SEVERITY, WITHOUT BEHAVIORAL DISTURBANCE, PSYCHOTIC DISTURBANCE, MOOD DISTURBANCE, AND ANXIETY: ICD-10-CM

## 2017-02-13 DIAGNOSIS — I10 ESSENTIAL (PRIMARY) HYPERTENSION: ICD-10-CM

## 2017-02-13 DIAGNOSIS — R62.7 ADULT FAILURE TO THRIVE: ICD-10-CM

## 2017-02-13 DIAGNOSIS — N39.0 URINARY TRACT INFECTION, SITE NOT SPECIFIED: ICD-10-CM

## 2017-07-08 NOTE — ED PROVIDER NOTE - DISPOSITION TYPE
Constitutional: mild distress flat affect AAOx3  Eyes: PERRLA EOMI  Head: Normocephalic atraumatic  Mouth: MMM  Cardiac: sinus tachycardia  Resp: Lungs CTAB  GI: Abd s/nt/nd  Neuro: CN2-12 intact  Skin: No rashes ADMIT

## 2018-05-05 NOTE — PROGRESS NOTE ADULT - PROBLEM/PLAN-4
DISPLAY PLAN FREE TEXT
DISPLAY PLAN FREE TEXT
no diplopia/no photophobia/no lacrimation L/no pain R/no lacrimation R/no pain L

## 2018-08-12 NOTE — ED ADULT NURSE NOTE - CCCP TRG CHIEF CMPLNT
Patient in with dizziness that started last night. She denies any falls . She states she has been eating and drinking normal. Denies urine frequency or hematuria. Denies chest pain or SOB. syncope

## 2020-03-05 NOTE — PATIENT PROFILE ADULT. - PRO ARRIVE FROM
Office did receive forms from [facility] and they were placed on Dr Villegas's desk for signature. Once completed office can fax back to the provided number: [phone]   transfer from 2c/other (specify)

## 2021-08-19 NOTE — H&P ADULT. - NS PRO PASSIVE SMOKE EXP
Occupational Therapy Evaluation      Jah Landry Day    8/19/2021    Principal Problem:    Acute on chronic diastolic heart failure (HCC)  Active Problems:    Hypokalemia    Chest pain    Acute kidney injury superimposed on CKD (HCC)    Elevated troponin level not due myocardial infarction    Sick sinus syndrome ([address] )    UTI (urinary tract infection)      Past Medical History:   Diagnosis Date    Anemia     Resolved 3/1/2017     Arthritis     Knee - Resolved 3/1/2017     Blind     CAD (coronary artery disease)     s/p HERNAN 6/2016    Calcaneal spur     [facility]est[facility] failure) [facility])     Chronic knee instability     Resolved 3/1/2017     Chronic pain syndrome     Resolved 3/1/2017     Dementia ([address] )     Last assessed 11/1/2017     Diabetes mellitus ([address] )     non-insulin depdenent    Disease of thyroid gland     Diverticulitis     Essential thrombocytopenia ([address] )     Last assessed 11/1/2017     GERD (gastroesophageal reflux disease)     History of aortic valve replacement     Resolved 3/1/2017     History of bilateral knee replacement     Resolved 3/1/2017     History of TIA (transient ischemic attack)     no residual deficits    Hyperlipidemia     Hypertension     Hypoxia     on home O2 QHS    Leukocytosis     Resolved 3/1/2017     Lumbar post-laminectomy syndrome     Resolved 3/1/2017     Meniere disease     Osteopenia     Pacemaker     [facility] in 2/2015    S/P TAVR (transcatheter aortic valve replacement)     Resolved 3/1/2017     Severe aortic stenosis     Thrombocytosis ([address] )     Resolved 4/5/2017     Type 2 diabetes mellitus ([facility] )     Last assessed 11/1/2017        Past Surgical History:   Procedure Laterality Date    APPENDECTOMY      BACK SURGERY      Arthrodesis     CARDIAC PACEMAKER PLACEMENT      CHOLECYSTECTOMY      EYE SURGERY      FRACTURE SURGERY Right 01/02/2018    femur    HYSTERECTOMY      [place] EGD TRANSORAL BIOPSY SINGLE/MULTIPLE N/A 11/9/2016    Procedure: ESOPHAGOGASTRODUODENOSCOPY (EGD); Surgeon: Lesly Domingo MD;  [facility]: [facility]; Service: Gastroenterology    NH OPEN RX FEMUR FX+INTRAMED GARRETT Right 1/2/2018    Procedure: INSERTION NAIL IM FEMUR ANTEGRADE (TROCHANTERIC) RIGHT;  Surgeon: Saray Ruiz MD;  [facility];  Service: Orthopedics    NH REPLACE AORTIC VALVE OPENFEMORAL ARTERY APPROACH N/A 7/26/2016    Procedure: TRANSFEMORAL TAVR WITH 23MM LAROSE LILY S3 VALVE; JOSE ALFREDO ;  Surgeon: Huy Velazquez DO;  [facility];  Service: Cardiac Surgery    SMALL INTESTINE SURGERY      TONSILLECTOMY      TOTAL KNEE ARTHROPLASTY      VARICOSE VEIN SURGERY      VENTRAL HERNIA REPAIR          08/19/21 1205   OT Last Visit   OT Visit Date 08/19/21   Note Type   Note type Evaluation   Restrictions/Precautions   Weight Bearing Precautions Per Order No   Other Precautions Telemetry; Fall Risk;Pain   Pain Assessment   Pain Assessment Tool FLACC   Pain Rating: FLACC (Rest) - Face 0   Pain Rating: FLACC (Rest) - Legs 0   Pain Rating: FLACC (Rest) - Activity 0   Pain Rating: FLACC (Rest) - Cry 0   Pain Rating: FLACC (Rest) - Consolability 0   Score: FLACC (Rest) 0   Pain Rating: FLACC (Activity) - Face 1   Pain Rating: FLACC (Activity) - Legs 1   Pain Rating: FLACC (Activity) - Activity 1   Pain Rating: FLACC (Activity) - Cry 1   Pain Rating: FLACC (Activity) - Consolability 1   Score: FLACC (Activity) 5   Home Living   Type of Home Assisted living  [facility]   Home Layout One level   Home Equipment Wheelchair-manual   Additional Comments Pt reports staff assist with all ADLs however pt states "doing what I can" with regard to dressing, G&H, and feeding  Pt was mainly performing stand pivot transfers with (S) however feels ability to perform transfers and mobilize has decreased due to short staffing at facility     Prior Function   Level of [place] Needs assistance with ADLs and functional mobility   Lives With Facility staff   [place] Help From Personal care attendant   ADL Assistance Needs assistance   IADLs Needs assistance   Falls in the last 6 months 1 to 4   Lifestyle   Autonomy self feeds, G&H with setup, arms in shirt, and stands while staff manage LB clothing and toileting hygiene   Reciprocal Relationships pt reports having 4 children and many grandchildren  "I talk to my daughter every day '   Intrinsic Gratification "I don't do enough where I live "    Psychosocial   Psychosocial (WDL) WDL   Subjective   Subjective "My feet are glued to the ground "    ADL   Eating Assistance 5  Supervision/Setup   Eating Deficit Setup   Grooming Assistance 5  Supervision/Setup   Grooming Deficit Setup   UB Bathing Assistance 2  Maximal Assistance   LB Bathing Assistance 2  Maximal Assistance   UB Dressing Assistance 2  Maximal Assistance   LB Dressing Assistance 1  Total Assistance   Toileting Assistance  1  Total Assistance   Bed Mobility   Rolling R 3  Moderate assistance   Additional items Assist x 2;Bedrails;Verbal cues   Rolling L 2  Maximal assistance   Additional items Assist x 2;Bedrails;Verbal cues; Increased time required   Supine to Sit 2  Maximal assistance   Additional items Assist x 2;Bedrails; Increased time required;Verbal cues;LE management   Sit to Supine 2  Maximal assistance   Additional items Assist x 2; Increased time required;LE management;Verbal cues   Additional Comments initially c/o mild dizziness however this resolved quickly  Cues for posture as pt initially with posterior lean   Transfers   Sit to Stand 3  Moderate assistance   Additional items Assist x 2;Verbal cues   Stand to Sit 3  Moderate assistance   Additional items Assist x 2; Increased time required;Verbal cues   Additional Comments not appropriate to progress transfer 2' unable to weight shift to advance LE's   Functional Mobility   Additional Comments see above   Balance   Static Sitting Fair   Static Standing Zero  (Ax2)   Activity Tolerance   Activity Tolerance Patient limited by fatigue   Nurse Made Aware RN cleared pt for OT evaluation  Pt presents in high fowlers position, agreeable to session, at the end of session, pt returned to bed with needs/call bell in reach   RUE Assessment   RUE Assessment X   RUE Strength   RUE Overall Strength Deficits   LUE Assessment   LUE Assessment WFL   Hand Function   Gross Motor Coordination Impaired  (RUE)   Fine Motor Coordination Functional   Sensation   Light Touch No apparent deficits   Vision-Basic Assessment   Visual History Macular degeneration   Vision - Complex Assessment   Additional Comments blind right eye and pt states, "I can't even see the big E in this (left) eye "    Cognition   Overall Cognitive Status [facility]   Arousal/Participation Alert; Cooperative  (extremely pleasant)   Attention Attends with cues to redirect   Orientation Level Oriented X4   Following Commands Follows one step commands with increased time or repetition   Comments Pt verified ID by stating full name and    Assessment   Limitation Decreased UE ROM; Decreased ADL status; Decreased Safe judgement during ADL;Decreased UE strength;Decreased endurance;Decreased high-level ADLs; Decreased self-care trans  (limited function of RUE)   Prognosis Fair   Assessment Pt is a 80 y o  female seen for OT evaluation s/p [facility] on [facility] w/ Acute on chronic diastolic heart failure ([facility] )  Comorbidities affecting pt's functional performance at time of assessment are listed above  Personal factors affecting pt at time of IE include:limited home support (pt reports "short staffed because of the walk-out" at [facility]), and advanced age, inability to carry out basic ADLs  Prior to admission, pt was reportedly receiving assistance with all basic self cares, however pt reports "doing what I can" such as assisting to don a shirt or standing with little support while staff would pull up pants or complete toileting hygiene   Upon evaluation: Pt requires max (A)x2 for bed mobility as well as mod (A)x2 for sit<>stand  Pt unable to progress toward chair as pt felt "feet glued to the ground " Pt was able to roll R/L with mod/max (A)  Pt presents below baseline level of independence 2* the following deficits impacting occupational performance: weakness, decreased ROM, decreased strength, decreased balance, decreased tolerance, impaired problem solving, decreased safety awareness and increased pain  Pt to benefit from continued skilled OT tx while in the hospital to address deficits as defined above and maximize level of functional independence w ADL's and functional mobility  Occupational Performance areas to address include: grooming, bathing/shower, toilet hygiene, dressing, community mobility, social participation and leisure participation  Pt with score of 25/100 on the Barthel Index  Pt's raw score on the AM-PAC Daily activity inpatient short form is 13, standardized score is 32 03, less than 39 4  Patients at this level are likely to benefit from DC to post acute inpatient rehab  Based on OT evaluation, the assessment(s) completed, performance deficits listed, and current level of function, pt identified as a HIGH complexity evaluation  From OT standpoint, recommendation at time of d/c would be post acute inpatient rehab (return to facility with OT services if facility able to assist pt at current level)  Goals   Patient Goals "To move better "    LTG Time Frame 7-10   Long Term Goal #1 see below for list of goals   Plan   Treatment Interventions ADL retraining;Functional transfer training;UE strengthening/ROM; Endurance training;Patient/family training;Equipment evaluation/education; Compensatory technique education; Activityengagement   Goal Expiration Date 08/29/21   OT Frequency 3-5x/wk   Recommendation   OT Discharge Recommendation Post acute rehabilitation services   AM-PAC Daily Activity Inpatient   Lower Body Dressing 1   Bathing 2   Toileting 1   Upper Body Dressing 2   Grooming 3   Eating 4   Daily Activity Raw Score 13   Daily Activity Standardized Score (Calc for Raw Score >=11) 32 03   AM-PAC Applied Cognition Inpatient   Following a Speech/Presentation 3   Understanding Ordinary Conversation 4   Taking Medications 3   Remembering Where Things Are Placed or Put Away 3   Remembering List of 4-5 Errands 2   Taking Care of Complicated Tasks 2   Applied Cognition Raw Score 17   Applied Cognition Standardized Score 36 52   Barthel Index   Feeding 5   Bathing 0   Grooming Score 5   Dressing Score 0   Bladder Score 0   Bowels Score 10   Toilet Use Score 0   Transfers (Bed/Chair) Score 5   Mobility (Level Surface) Score 0   Stairs Score 0   Barthel Index Score 25   GOALS:    Pt will achieve the following within specified time frame:  *Perform ADL transfers with mod (A)x1 for inc'd independence with ADLs/purposeful tasks    *Perform UB ADL min (A) for inc'd independence with self cares    *Maintain supported standing x4mins to decrease caregiver burden and aide in staff being able to bathe, toilet, and dress LB    *Increase dynamic seated balance to F+ for inc'd safety with seated purposeful tasks       *Increase static stand balance to F with support for inc'd safety with standing purposeful tasks    *Perform clothing management/hygiene for toileting mod (A) for inc'd independence with self cares    *Pt will sit on EOB x15 for inc'd seated activity tolerance with purposeful tasks      Jeffery Lee, OT Unknown

## 2022-11-26 NOTE — ED ADULT NURSE NOTE - CHIEF COMPLAINT QUOTE
pt found on bathroom floor by . as per pt's  " when I found her she wasn't talking just starring at the ceiling."  pt denies pain at present. pt has history of dementia. denies taking any blood thinners.
 lbs. %%. IBW used to calculate energy needs due to pt's current body weight exceeding 120% of IBW. Needs adjusted for age and wt, SIRS. Fluids per team, noted L pleural effusions

## 2023-03-23 NOTE — PATIENT PROFILE ADULT. - HEALTH/HEALTHCARE ANXIETIES, PROFILE
Results for orders placed or performed in visit on 03/23/23   Cardiac EP device report    Narrative    MDT-SINGLE CHAMBER PPM/ NOT MRI CONDITIONAL  NON-BILLABLE CCARELINK TRANSMISSION: BATTERTY STATUS: BATTERY VOLTAGE NEARING ISABELA (11 MOS~<1-23 MOS)  WILL SCHEDULE MONTHLY BATTERY CHECKS  : 28 5%  ALL AVAILABLE LEAD PARAMETERS WITHIN NORMAL LIMITS  14 VHR EPISODES W/ AVAIL EGMS/MARKERS SHOWING AF/RVR W/ -200 BPM, MAX DURATION 1 MIN 5 SECS  PT TAKES WARFARIN, METOPROLOL TART  EF: 55% (ECHO 5/4/21)  PACEMAKER FUNCTIONING APPROPRIATELY    [address] [place] [address] Street
none

## 2024-08-03 NOTE — ED ADULT NURSE REASSESSMENT NOTE - MUSCULOSKELETAL WDL
Please return to the ER for any new or concerning symptoms including but not limited to worsening headache, persistent vomiting, confusion, inability to walk or talk, or any other concerning symptoms.  Please avoid physical activity or activities that are at risk for recurrent head injuries for at least 1 month.  Please contact your PCP to schedule follow-up and discuss things like return to work, play, school.   Full range of motion of upper and lower extremities, no joint tenderness/swelling.